# Patient Record
Sex: MALE | Race: OTHER | NOT HISPANIC OR LATINO | URBAN - METROPOLITAN AREA
[De-identification: names, ages, dates, MRNs, and addresses within clinical notes are randomized per-mention and may not be internally consistent; named-entity substitution may affect disease eponyms.]

---

## 2019-03-11 ENCOUNTER — EMERGENCY (EMERGENCY)
Facility: HOSPITAL | Age: 29
LOS: 1 days | Discharge: HOME | End: 2019-03-11
Admitting: EMERGENCY MEDICINE
Payer: COMMERCIAL

## 2019-03-11 VITALS
RESPIRATION RATE: 16 BRPM | TEMPERATURE: 98 F | HEART RATE: 90 BPM | DIASTOLIC BLOOD PRESSURE: 84 MMHG | OXYGEN SATURATION: 100 % | SYSTOLIC BLOOD PRESSURE: 128 MMHG

## 2019-03-11 PROCEDURE — 99282 EMERGENCY DEPT VISIT SF MDM: CPT

## 2019-03-11 RX ORDER — ALPRAZOLAM 0.25 MG
0.25 TABLET ORAL ONCE
Qty: 0 | Refills: 0 | Status: COMPLETED | OUTPATIENT
Start: 2019-03-11 | End: 2019-03-11

## 2019-03-11 RX ORDER — IBUPROFEN 200 MG
600 TABLET ORAL ONCE
Qty: 0 | Refills: 0 | Status: DISCONTINUED | OUTPATIENT
Start: 2019-03-11 | End: 2019-03-15
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
## 2019-03-11 NOTE — ED PROVIDER NOTE - CARE PLAN
Principal Discharge DX:	Headache  Assessment and plan of treatment:	Advance activity as tolerated.  Continue all previously prescribed medications as directed unless otherwise instructed.  Follow up with your primary care physician in 48-72 hours- bring copies of your results.  Return to the ER for worsening or persistent symptoms, and/or ANY NEW OR CONCERNING SYMPTOMS. If you have issues obtaining follow up, please call: [phone] to obtain a doctor or specialist who takes your insurance in your area.  You may call [phone] to make an appointment with the internal medicine clinic.  Secondary Diagnosis:	Stress at home

## 2019-03-11 NOTE — ED PROVIDER NOTE - NSFOLLOWUPINSTRUCTIONS_ED_ALL_ED_FT
Advance activity as tolerated.  Continue all previously prescribed medications as directed unless otherwise instructed.  Follow up with your primary care physician in 48-72 hours- bring copies of your results.  Return to the ER for worsening or persistent symptoms, and/or ANY NEW OR CONCERNING SYMPTOMS. If you have issues obtaining follow up, please call: [phone] to obtain a doctor or specialist who takes your insurance in your area.  You may call [phone] to make an appointment with the internal medicine clinic.

## 2019-03-11 NOTE — ED PROVIDER NOTE - PLAN OF CARE
Advance activity as tolerated.  Continue all previously prescribed medications as directed unless otherwise instructed.  Follow up with your primary care physician in 48-72 hours- bring copies of your results.  Return to the ER for worsening or persistent symptoms, and/or ANY NEW OR CONCERNING SYMPTOMS. If you have issues obtaining follow up, please call: [phone] to obtain a doctor or specialist who takes your insurance in your area.  You may call [phone] to make an appointment with the internal medicine clinic.

## 2019-03-11 NOTE — ED PROVIDER NOTE - CLINICAL SUMMARY MEDICAL DECISION MAKING FREE TEXT BOX
A:  -HA, stress at home  P:  -Motrin 600mgs PO, Xanax .25mgs given in ED, pt will be taking Uber home, states he feels safe to return home. Given instructions to follow up with Crisis [facility]. Pt stated he will call tomorrow to obtain appt. Pt stable to be discharged from the ED.

## 2019-03-26 ENCOUNTER — EMERGENCY (EMERGENCY)
Facility: HOSPITAL | Age: 29
LOS: 1 days | Discharge: HOME | End: 2019-03-26
Admitting: EMERGENCY MEDICINE
Payer: SELF-PAY

## 2019-03-26 VITALS
RESPIRATION RATE: 16 BRPM | DIASTOLIC BLOOD PRESSURE: 77 MMHG | SYSTOLIC BLOOD PRESSURE: 119 MMHG | OXYGEN SATURATION: 100 % | HEART RATE: 73 BPM | TEMPERATURE: 99 F

## 2019-03-26 PROCEDURE — 99283 EMERGENCY DEPT VISIT LOW MDM: CPT

## 2019-03-26 RX ORDER — SODIUM CHLORIDE 9 MG/ML
1000 INJECTION INTRAMUSCULAR; INTRAVENOUS; SUBCUTANEOUS ONCE
Qty: 0 | Refills: 0 | Status: DISCONTINUED | OUTPATIENT
Start: 2019-03-26 | End: 2019-03-26

## 2019-03-26 RX ORDER — METOCLOPRAMIDE HCL 10 MG
10 TABLET ORAL ONCE
Qty: 0 | Refills: 0 | Status: DISCONTINUED | OUTPATIENT
Start: 2019-03-26 | End: 2019-03-26

## 2019-03-26 RX ORDER — IBUPROFEN 200 MG
600 TABLET ORAL ONCE
Qty: 0 | Refills: 0 | Status: COMPLETED | OUTPATIENT
Start: 2019-03-26 | End: 2019-03-26

## 2019-03-26 RX ORDER — ACETAMINOPHEN 500 MG
650 TABLET ORAL ONCE
Qty: 0 | Refills: 0 | Status: COMPLETED | OUTPATIENT
Start: 2019-03-26 | End: 2019-03-26
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
## 2019-03-26 NOTE — ED PROVIDER NOTE - CONSTITUTIONAL, MLM
normal... Well appearing, well nourished, awake, alert, oriented to person, place, time/situation and in no apparent distress. +Pt mildly anxious appearing

## 2019-03-26 NOTE — ED PROVIDER NOTE - PROGRESS NOTE DETAILS
Pt does not want IV medications, just wants to trial tylenol and motrin. Pt seen by SW, pt states he will get a divorce. Will give behavioral health followup.

## 2019-03-26 NOTE — ED PROVIDER NOTE - CLINICAL SUMMARY MEDICAL DECISION MAKING FREE TEXT BOX
29 y/o male here with c/o gradually tension HA in the setting of stress. Plan for pain control and   to discuss counseling outside of the hospital.

## 2019-03-26 NOTE — ED ADULT TRIAGE NOTE - CHIEF COMPLAINT QUOTE
Pt comes in for c/o HA that has been presently all day. Pt denies blurred vision or n/v, appears in no acute distress and vs as noted. and pt reports taking advil at 6pm with no relief.
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
## 2019-03-26 NOTE — ED PROVIDER NOTE - CHPI ED SYMPTOMS NEG
no fever/No vison changes, blurry vision, weakness, numbness, tingling, CP, SOB, N/V/D, f/c, abd pain, auditory hallucination, SI, HI.

## 2019-03-27 VITALS
RESPIRATION RATE: 16 BRPM | HEART RATE: 80 BPM | SYSTOLIC BLOOD PRESSURE: 128 MMHG | OXYGEN SATURATION: 100 % | DIASTOLIC BLOOD PRESSURE: 82 MMHG

## 2019-03-27 RX ADMIN — Medication 650 MILLIGRAM(S): at 00:45

## 2019-03-27 RX ADMIN — Medication 600 MILLIGRAM(S): at 00:46

## 2019-09-02 ENCOUNTER — EMERGENCY (EMERGENCY)
Facility: HOSPITAL | Age: 29
LOS: 1 days | Discharge: HOME | End: 2019-09-02
Attending: EMERGENCY MEDICINE | Admitting: EMERGENCY MEDICINE
Payer: MEDICAID

## 2019-09-02 VITALS
TEMPERATURE: 99 F | DIASTOLIC BLOOD PRESSURE: 103 MMHG | SYSTOLIC BLOOD PRESSURE: 142 MMHG | RESPIRATION RATE: 16 BRPM | HEART RATE: 58 BPM | OXYGEN SATURATION: 100 %

## 2019-09-02 PROCEDURE — 93010 ELECTROCARDIOGRAM REPORT: CPT

## 2019-09-02 PROCEDURE — 99284 EMERGENCY DEPT VISIT MOD MDM: CPT | Mod: 25

## 2019-09-02 NOTE — ED ADULT TRIAGE NOTE - CHIEF COMPLAINT QUOTE
C/o chest pain, abd pain and B/L leg pain x 10 days. Endorses nausea, denies vomiting. Pt was seen at OSH yesterday where CT abd and chest x-ray were unremarkable. Denies PMH.

## 2019-09-03 PROCEDURE — 93970 EXTREMITY STUDY: CPT | Mod: 26

## 2019-09-03 RX ORDER — KETOROLAC TROMETHAMINE 30 MG/ML
30 SYRINGE (ML) INJECTION ONCE
Refills: 0 | Status: DISCONTINUED | OUTPATIENT
Start: 2019-09-03 | End: 2019-09-03

## 2019-09-03 RX ORDER — ACETAMINOPHEN 500 MG
975 TABLET ORAL ONCE
Refills: 0 | Status: COMPLETED | OUTPATIENT
Start: 2019-09-03 | End: 2019-09-03

## 2019-09-03 RX ADMIN — Medication 30 MILLIGRAM(S): at 02:02

## 2019-09-03 RX ADMIN — Medication 975 MILLIGRAM(S): at 03:53

## 2019-09-03 NOTE — ED PROVIDER NOTE - PATIENT PORTAL LINK FT
You can access the FollowMyHealth Patient Portal offered by [facility] by registering at the following website: [email or web address]. By joining [email or web address]’s [email or web address] portal, you will also be able to view your health information using other applications (apps) compatible with our system.

## 2019-09-03 NOTE — ED PROVIDER NOTE - ATTENDING CONTRIBUTION TO CARE
pt presenting with chest pain, abd pain and no SOB. Has been having symptoms for a few days now.  Went to OSH where he had labs and a CT scan and found to have some constipation.  Was dx with Miralax.  Took one dose and still had symptoms.  Also with BLE pain throughout leg but also in calf.  Works as a     Gen: Well appearing in NAD  Head: NC/AT  Neck: trachea midline  Resp:  No distress CTAB  CV: RRR  Ext: no deformities  Neuro:  A&O appears non focal  Skin:  Warm and dry as visualized  Psych:  Normal affect and mood     Pt with abd, chest and leg pain.  Already had a CT scan that was negative.  PERC negative but as  and sitting for long periods will get duplex to ensure leg pain no DVT.  Had negative cardiac labs and CK at OSH.  EKG here without signs of pericarditis.  No indication to scan again.  Will treat symptomatically.

## 2019-09-03 NOTE — ED PROVIDER NOTE - CLINICAL SUMMARY MEDICAL DECISION MAKING FREE TEXT BOX
27 yo M, w/ no known PMH, presenting w/ BL leg pain x 10 days, as well as chest pain, abdominal pain x 2 days. Had extensive workup yesterday at [facility], and entire workup negative. CP non-exertional, no shortness of breath, pain worse with palpation. Abdomen non-tender, patient eating/drinking normally, has constipation. Will obtain EKG and discharge patient home w/ PMD followup.

## 2019-09-03 NOTE — ED PROVIDER NOTE - NSFOLLOWUPINSTRUCTIONS_ED_ALL_ED_FT
You may take 1000mg of Tylenol every 6 hours for baseline pain control with respect to the warnings on the label. You may take 600mg of ibuprofen (example: motrin or advil) every 6 hours for baseline pain control as indicated with respect to the warnings on the label. This is an over the counter medication.     Please follow up with your primary care provider as soon as possible. If you do not have a primary care doctor, you can make an appointment with one at the following location.   [facility] [place]  Phone: [phone]  Fax: [phone]  Address: [address] [place] [address], [facility] [address], [place] [address]    Additionally, you may use the following web address to find a [facility] physician close to you: [email or web address]     Please return to the emergency department if you experience worsening symptoms, or if you develop headache, neck stiffness, fever/chills, changes in vision, chest pain, shortness of breath, difficulty breathing, palpitations, lightheadedness, weakness, dizziness, numbness, tingling, abdominal pain, nausea, vomiting, diarrhea, changes in your urine, blood in the urine, painful urination, syncope (passing out), or for any other concerns.

## 2019-09-03 NOTE — ED PROVIDER NOTE - CARE PLAN
Principal Discharge DX:	Leg pain, bilateral  Secondary Diagnosis:	Chest pain  Secondary Diagnosis:	Abdominal pain

## 2019-09-03 NOTE — ED PROVIDER NOTE - OBJECTIVE STATEMENT
27 yo M, accompanied by friends, w/ no known PMH and on no daily medications, presenting from home d/t BL LE pain x 10 days, as well as mild chest pain and mild diffuse abdominal pain x 2 days. Patient went to [facility] yesterday for these symptoms. Had extensive workup performed in ED. Patient was diagnosed with bloating and discharged home on Miralax. As part of workup, had normal CBC, CMP, CKMB, troponin, cxr, and CT abdomen/pelvis. CT did incidentally show L5 spondylosis and L5-S1 spondylolisthesis. Liver, gallbladder, pancreas, spleen, kidneys, adrenals, appendix, vasculature, lymph nodes, bladder, prostate, and soft tissues were all unremarkable. Stomach and bowel showed moderate stool in ascending colon without inflammation or obstruction. Patient endorses chronic constipation - last BM was yesterday and described as hard. Patient denies abnormal urine, dysuria, hematuria, nausea, vomiting. Denies exertional chest pain or shortness of breath. States that pushing on chest makes the pain worse. Denies any other complaints. Patient has been eating/drinking normally and doing all of his normal activities.     PMD: Dr. Simental  Pharm: [facility], [address] [place], [place]

## 2019-09-03 NOTE — ED ADULT NURSE REASSESSMENT NOTE - NS ED NURSE REASSESS COMMENT FT1
Pt resting on stretcher, states to feel much better, denies chest pain, sob. Respirations even and unlabored, NAD noted. will continue to monitor

## 2019-09-03 NOTE — ED ADULT NURSE NOTE - NSIMPLEMENTINTERV_GEN_ALL_ED
Implemented All Universal Safety Interventions:  [place] to call system. Call bell, personal items and telephone within reach. Instruct patient to call for assistance. Room bathroom lighting operational. Non-slip footwear when patient is off stretcher. Physically safe environment: no spills, clutter or unnecessary equipment. Stretcher in lowest position, wheels locked, appropriate side rails in place.

## 2020-03-08 ENCOUNTER — EMERGENCY (EMERGENCY)
Facility: HOSPITAL | Age: 30
LOS: 1 days | Discharge: HOME | End: 2020-03-08
Attending: EMERGENCY MEDICINE | Admitting: EMERGENCY MEDICINE
Payer: MEDICAID

## 2020-03-08 VITALS
DIASTOLIC BLOOD PRESSURE: 51 MMHG | SYSTOLIC BLOOD PRESSURE: 109 MMHG | TEMPERATURE: 100 F | OXYGEN SATURATION: 99 % | RESPIRATION RATE: 18 BRPM | HEART RATE: 84 BPM

## 2020-03-08 VITALS
SYSTOLIC BLOOD PRESSURE: 123 MMHG | DIASTOLIC BLOOD PRESSURE: 78 MMHG | RESPIRATION RATE: 16 BRPM | HEART RATE: 94 BPM | TEMPERATURE: 101 F | OXYGEN SATURATION: 97 %

## 2020-03-08 LAB
ALBUMIN SERPL ELPH-MCNC: 4.3 G/DL — SIGNIFICANT CHANGE UP (ref 3.3–5)
ALP SERPL-CCNC: 103 U/L — SIGNIFICANT CHANGE UP (ref 40–120)
ALT FLD-CCNC: 34 U/L — SIGNIFICANT CHANGE UP (ref 4–41)
ANION GAP SERPL CALC-SCNC: 15 MMO/L — HIGH (ref 7–14)
APPEARANCE UR: CLEAR — SIGNIFICANT CHANGE UP
APTT BLD: 35.7 SEC — SIGNIFICANT CHANGE UP (ref 27.5–36.3)
AST SERPL-CCNC: 31 U/L — SIGNIFICANT CHANGE UP (ref 4–40)
B PERT DNA SPEC QL NAA+PROBE: NOT DETECTED — SIGNIFICANT CHANGE UP
BACTERIA # UR AUTO: NEGATIVE — SIGNIFICANT CHANGE UP
BASE EXCESS BLDV CALC-SCNC: 2.5 MMOL/L — SIGNIFICANT CHANGE UP
BASOPHILS # BLD AUTO: 0 K/UL — SIGNIFICANT CHANGE UP (ref 0–0.2)
BASOPHILS NFR BLD AUTO: 0 % — SIGNIFICANT CHANGE UP (ref 0–2)
BASOPHILS NFR SPEC: 0 % — SIGNIFICANT CHANGE UP (ref 0–2)
BILIRUB SERPL-MCNC: 0.4 MG/DL — SIGNIFICANT CHANGE UP (ref 0.2–1.2)
BILIRUB UR-MCNC: NEGATIVE — SIGNIFICANT CHANGE UP
BLASTS # FLD: 0 % — SIGNIFICANT CHANGE UP (ref 0–0)
BLOOD GAS VENOUS - CREATININE: SIGNIFICANT CHANGE UP MG/DL (ref 0.5–1.3)
BLOOD UR QL VISUAL: NEGATIVE — SIGNIFICANT CHANGE UP
BUN SERPL-MCNC: 11 MG/DL — SIGNIFICANT CHANGE UP (ref 7–23)
C PNEUM DNA SPEC QL NAA+PROBE: NOT DETECTED — SIGNIFICANT CHANGE UP
CALCIUM SERPL-MCNC: 9 MG/DL — SIGNIFICANT CHANGE UP (ref 8.4–10.5)
CHLORIDE BLDV-SCNC: 102 MMOL/L — SIGNIFICANT CHANGE UP (ref 96–108)
CHLORIDE SERPL-SCNC: 102 MMOL/L — SIGNIFICANT CHANGE UP (ref 98–107)
CO2 SERPL-SCNC: 24 MMOL/L — SIGNIFICANT CHANGE UP (ref 22–31)
COLOR SPEC: YELLOW — SIGNIFICANT CHANGE UP
CREAT SERPL-MCNC: 1.11 MG/DL — SIGNIFICANT CHANGE UP (ref 0.5–1.3)
EOSINOPHIL # BLD AUTO: 0 K/UL — SIGNIFICANT CHANGE UP (ref 0–0.5)
EOSINOPHIL NFR BLD AUTO: 0 % — SIGNIFICANT CHANGE UP (ref 0–6)
EOSINOPHIL NFR FLD: 0.9 % — SIGNIFICANT CHANGE UP (ref 0–6)
FLUAV H1 2009 PAND RNA SPEC QL NAA+PROBE: NOT DETECTED — SIGNIFICANT CHANGE UP
FLUAV H1 RNA SPEC QL NAA+PROBE: NOT DETECTED — SIGNIFICANT CHANGE UP
FLUAV H3 RNA SPEC QL NAA+PROBE: NOT DETECTED — SIGNIFICANT CHANGE UP
FLUAV SUBTYP SPEC NAA+PROBE: NOT DETECTED — SIGNIFICANT CHANGE UP
FLUBV RNA SPEC QL NAA+PROBE: NOT DETECTED — SIGNIFICANT CHANGE UP
GAS PNL BLDV: 142 MMOL/L — SIGNIFICANT CHANGE UP (ref 136–146)
GLUCOSE BLDV-MCNC: 99 MG/DL — SIGNIFICANT CHANGE UP (ref 70–99)
GLUCOSE SERPL-MCNC: 96 MG/DL — SIGNIFICANT CHANGE UP (ref 70–99)
GLUCOSE UR-MCNC: NEGATIVE — SIGNIFICANT CHANGE UP
HADV DNA SPEC QL NAA+PROBE: NOT DETECTED — SIGNIFICANT CHANGE UP
HCO3 BLDV-SCNC: 25 MMOL/L — SIGNIFICANT CHANGE UP (ref 20–27)
HCOV PNL SPEC NAA+PROBE: SIGNIFICANT CHANGE UP
HCT VFR BLD CALC: 44.9 % — SIGNIFICANT CHANGE UP (ref 39–50)
HCT VFR BLDV CALC: 45.1 % — SIGNIFICANT CHANGE UP (ref 39–51)
HGB BLD-MCNC: 14.1 G/DL — SIGNIFICANT CHANGE UP (ref 13–17)
HGB BLDV-MCNC: 14.7 G/DL — SIGNIFICANT CHANGE UP (ref 13–17)
HMPV RNA SPEC QL NAA+PROBE: NOT DETECTED — SIGNIFICANT CHANGE UP
HPIV1 RNA SPEC QL NAA+PROBE: NOT DETECTED — SIGNIFICANT CHANGE UP
HPIV2 RNA SPEC QL NAA+PROBE: NOT DETECTED — SIGNIFICANT CHANGE UP
HPIV3 RNA SPEC QL NAA+PROBE: NOT DETECTED — SIGNIFICANT CHANGE UP
HPIV4 RNA SPEC QL NAA+PROBE: NOT DETECTED — SIGNIFICANT CHANGE UP
HYALINE CASTS # UR AUTO: NEGATIVE — SIGNIFICANT CHANGE UP
IMM GRANULOCYTES NFR BLD AUTO: 0.7 % — SIGNIFICANT CHANGE UP (ref 0–1.5)
INR BLD: 1.12 — SIGNIFICANT CHANGE UP (ref 0.88–1.17)
KETONES UR-MCNC: NEGATIVE — SIGNIFICANT CHANGE UP
LACTATE BLDV-MCNC: 1.1 MMOL/L — SIGNIFICANT CHANGE UP (ref 0.5–2)
LEUKOCYTE ESTERASE UR-ACNC: NEGATIVE — SIGNIFICANT CHANGE UP
LYMPHOCYTES # BLD AUTO: 0.83 K/UL — LOW (ref 1–3.3)
LYMPHOCYTES # BLD AUTO: 27.4 % — SIGNIFICANT CHANGE UP (ref 13–44)
LYMPHOCYTES NFR SPEC AUTO: 10.7 % — LOW (ref 13–44)
MCHC RBC-ENTMCNC: 28.3 PG — SIGNIFICANT CHANGE UP (ref 27–34)
MCHC RBC-ENTMCNC: 31.4 % — LOW (ref 32–36)
MCV RBC AUTO: 90.2 FL — SIGNIFICANT CHANGE UP (ref 80–100)
METAMYELOCYTES # FLD: 0 % — SIGNIFICANT CHANGE UP (ref 0–1)
MONOCYTES # BLD AUTO: 0.26 K/UL — SIGNIFICANT CHANGE UP (ref 0–0.9)
MONOCYTES NFR BLD AUTO: 8.6 % — SIGNIFICANT CHANGE UP (ref 2–14)
MONOCYTES NFR BLD: 1.8 % — LOW (ref 2–9)
MYELOCYTES NFR BLD: 0 % — SIGNIFICANT CHANGE UP (ref 0–0)
NEUTROPHIL AB SER-ACNC: 68.7 % — SIGNIFICANT CHANGE UP (ref 43–77)
NEUTROPHILS # BLD AUTO: 1.92 K/UL — SIGNIFICANT CHANGE UP (ref 1.8–7.4)
NEUTROPHILS NFR BLD AUTO: 63.3 % — SIGNIFICANT CHANGE UP (ref 43–77)
NEUTS BAND # BLD: 4.5 % — SIGNIFICANT CHANGE UP (ref 0–6)
NITRITE UR-MCNC: NEGATIVE — SIGNIFICANT CHANGE UP
NRBC # FLD: 0 K/UL — SIGNIFICANT CHANGE UP (ref 0–0)
OTHER - HEMATOLOGY %: 0 — SIGNIFICANT CHANGE UP
PCO2 BLDV: 48 MMHG — SIGNIFICANT CHANGE UP (ref 41–51)
PH BLDV: 7.37 PH — SIGNIFICANT CHANGE UP (ref 7.32–7.43)
PH UR: 6.5 — SIGNIFICANT CHANGE UP (ref 5–8)
PLATELET # BLD AUTO: 135 K/UL — LOW (ref 150–400)
PLATELET COUNT - ESTIMATE: SIGNIFICANT CHANGE UP
PMV BLD: 10.2 FL — SIGNIFICANT CHANGE UP (ref 7–13)
PO2 BLDV: < 24 MMHG — LOW (ref 35–40)
POTASSIUM BLDV-SCNC: 3.6 MMOL/L — SIGNIFICANT CHANGE UP (ref 3.4–4.5)
POTASSIUM SERPL-MCNC: 3.5 MMOL/L — SIGNIFICANT CHANGE UP (ref 3.5–5.3)
POTASSIUM SERPL-SCNC: 3.5 MMOL/L — SIGNIFICANT CHANGE UP (ref 3.5–5.3)
PROMYELOCYTES # FLD: 0 % — SIGNIFICANT CHANGE UP (ref 0–0)
PROT SERPL-MCNC: 7.8 G/DL — SIGNIFICANT CHANGE UP (ref 6–8.3)
PROT UR-MCNC: 50 — SIGNIFICANT CHANGE UP
PROTHROM AB SERPL-ACNC: 12.9 SEC — SIGNIFICANT CHANGE UP (ref 9.8–13.1)
RBC # BLD: 4.98 M/UL — SIGNIFICANT CHANGE UP (ref 4.2–5.8)
RBC # FLD: 13.2 % — SIGNIFICANT CHANGE UP (ref 10.3–14.5)
RBC CASTS # UR COMP ASSIST: SIGNIFICANT CHANGE UP (ref 0–?)
REVIEW TO FOLLOW: YES — SIGNIFICANT CHANGE UP
RSV RNA SPEC QL NAA+PROBE: NOT DETECTED — SIGNIFICANT CHANGE UP
RV+EV RNA SPEC QL NAA+PROBE: NOT DETECTED — SIGNIFICANT CHANGE UP
SAO2 % BLDV: 34.2 % — LOW (ref 60–85)
SODIUM SERPL-SCNC: 141 MMOL/L — SIGNIFICANT CHANGE UP (ref 135–145)
SP GR SPEC: 1.02 — SIGNIFICANT CHANGE UP (ref 1–1.04)
SQUAMOUS # UR AUTO: SIGNIFICANT CHANGE UP
UROBILINOGEN FLD QL: NORMAL — SIGNIFICANT CHANGE UP
VARIANT LYMPHS # BLD: 13.4 % — SIGNIFICANT CHANGE UP
WBC # BLD: 3.03 K/UL — LOW (ref 3.8–10.5)
WBC # FLD AUTO: 3.03 K/UL — LOW (ref 3.8–10.5)
WBC UR QL: SIGNIFICANT CHANGE UP (ref 0–?)

## 2020-03-08 PROCEDURE — 99284 EMERGENCY DEPT VISIT MOD MDM: CPT

## 2020-03-08 PROCEDURE — 71046 X-RAY EXAM CHEST 2 VIEWS: CPT | Mod: 26

## 2020-03-08 RX ORDER — ACETAMINOPHEN 500 MG
650 TABLET ORAL ONCE
Refills: 0 | Status: COMPLETED | OUTPATIENT
Start: 2020-03-08 | End: 2020-03-08

## 2020-03-08 RX ORDER — SODIUM CHLORIDE 9 MG/ML
1000 INJECTION INTRAMUSCULAR; INTRAVENOUS; SUBCUTANEOUS ONCE
Refills: 0 | Status: COMPLETED | OUTPATIENT
Start: 2020-03-08 | End: 2020-03-08

## 2020-03-08 RX ADMIN — SODIUM CHLORIDE 1000 MILLILITER(S): 9 INJECTION INTRAMUSCULAR; INTRAVENOUS; SUBCUTANEOUS at 12:16

## 2020-03-08 RX ADMIN — Medication 650 MILLIGRAM(S): at 12:16

## 2020-03-08 NOTE — ED PROVIDER NOTE - ATTENDING CONTRIBUTION TO CARE
DR. BARONE, ATTENDING MD-  I performed a face to face bedside interview with the patient regarding history of present illness, review of symptoms and past medical history. I completed an independent physical exam.  I have discussed the patient's plan of care with the resident.   Documentation as above in the note.    30 y/o male with fever x1 wk a/w cough.  No recent travel or known sick contacts.  Clear lungs, non toxic appearing.  PNA vs flu vs other viral syndrome.  Obtain cxr, flu swab, antic dc with pcp f/u.

## 2020-03-08 NOTE — ED PROVIDER NOTE - PROGRESS NOTE DETAILS
Joseph Frankel PGY1: Patient's labs unremarkable for source of infection. RVP negative. Patient with no sick contacts or recent travel concerning for coronavirus. Will dc with close PMD follow up. Discussed plan and return precautions with patient who understands and agrees. Joseph Frankel PGY1: Patient's labs unremarkable for source of infection. Not clinically septic. RVP negative. Patient with no sick contacts or recent travel concerning for coronavirus. Will dc with close PMD follow up. Discussed plan and return precautions with patient who understands and agrees.

## 2020-03-08 NOTE — ED PROVIDER NOTE - OBJECTIVE STATEMENT
30 yo M with no PMH with fever for one week. Was seen outpatient and has been taken Augmentin and tamaflu with no resolution of his symptoms. Patient states that his only symptoms are fever with intermittent non productive cough developing yesterday. Denies soar throat, neck pain, urinary symptoms n/v, rash. Sexually active with his wife only. No penile discharge. 30 yo M with no PMH with fever for one week. Was seen outpatient and has been taken Augmentin and tamaflu with no resolution of his symptoms. Patient states that his only symptoms are fever with intermittent non productive cough developing yesterday. Denies soar throat, neck pain, urinary symptoms n/v, rash. Denies recent travel or sick contacts. Sexually active with his wife only. No penile discharge.

## 2020-03-08 NOTE — ED PROVIDER NOTE - PATIENT PORTAL LINK FT
You can access the FollowMyHealth Patient Portal offered by [facility] by registering at the following website: [email or web address]. By joining [email or web address]’s FollowMyHealth portal, you will also be able to view your health information using other applications (apps) compatible with our system.

## 2020-03-08 NOTE — ED PROVIDER NOTE - CLINICAL SUMMARY MEDICAL DECISION MAKING FREE TEXT BOX
Joseph Frankel PGY1: 28 yo with no pmh presenting for 1 week of fever. Patient febrile and HR of 94. Otherwise VSS. Patient looks well and is non toxic appearing. PE as above. Patient meets SIRs criteria but low suspicion for sepsis and most likely viral syndrome. However, will get sepsis bundle, RVP, flu swab, cxr, and tylenol. Will reassess

## 2020-03-08 NOTE — ED PROVIDER NOTE - NSFOLLOWUPINSTRUCTIONS_ED_ALL_ED_FT
You were seen in the Emergency Department for fever. The most likely cause is a viral illness. There is no sign of bacterial infection. You should follow up with your primary care doctor tomorrow. Continue taking the medications he prescribed until speaking with your doctor.      - Continue taking tylenol for fever. Please do not exceed 3250 mg in 24 hours.      - Return to the Emergency Department for worsening or persistent symptoms, and/or ANY NEW OR CONCERNING SYMPTOMS. If you have issues obtaining follow up, please call: [phone] to obtain a doctor or specialist who takes your insurance in your area.

## 2020-03-08 NOTE — ED PROVIDER NOTE - NS ED ROS FT
Gen: + fever, denies weight loss  CV: Denies chest pain, palpitations  HEENT: Denies neck pain, sore throat, eye discharge  Skin: Denies rash, erythema, color changes  Resp: Denies SOB,+ cough  Endo: Denies sensitivity to heat, cold, increased urination  GI: Denies constipation, nausea, vomiting, abdominal pain  Msk: Denies back pain, LE swelling, extremity pain  : Denies dysuria, increased frequency  Neuro: Denies LOC, weakness, seizures  Psych: Denies hx of psych, hallucinations, SI

## 2020-03-08 NOTE — ED PROVIDER NOTE - PHYSICAL EXAMINATION
Gen: Alert and oriented. Appears flushed. Lying comfortably in bed. Answering questions appropriately  HEENT: extra occular movements intact, no nasal discharge, mucous membranes moist. Uvula midline. No peritonsillar abscess noted. No nuchal rigidity  CV: Regular rate and rhythm, +S1/S2, no murmurs/rubs/gallops,   Resp: Clear to ausculation bilaterally, no wheezes/rhonchi/rales  GI: Abdomen soft non-distended, non tender to palpation, no masses  MSK: No open wounds, no bruising, no LE edema, Homans sign negative bl  Neuro: A&Ox4, following commands, moving all four extremities spontaneously  Psych: appropriate mood

## 2020-03-08 NOTE — ED ADULT NURSE NOTE - OBJECTIVE STATEMENT
intake rm 5: 30 yo male, c/o fever (tmax 101.0) x 7 days. pt saw pmd and was started on amoxicillian 3 days ago, but fever persists. pt reports that cough began yesterday. pt denies headache, photophobia, neck stiffness, chest pain, sob, abdominal pain, n/v/d, dysuria. 18g iv insert to left ac, labs sent as ordered. EKG in progress. NS infusing well. pt medicated as ordered. pt denies smoking, recent travel, sick contacts

## 2020-03-08 NOTE — ED ADULT NURSE NOTE - CHIEF COMPLAINT QUOTE
Patient c/o having a fever x 7 days, he was started on antibiotics by his pmd and has taken them for the past 3 days but the fever persists.

## 2020-03-09 LAB
CULTURE RESULTS: NO GROWTH — SIGNIFICANT CHANGE UP
SPECIMEN SOURCE: SIGNIFICANT CHANGE UP

## 2020-03-09 NOTE — ED PROVIDER NOTE - NS ED ATTENDING NAME FT
I have personally performed a face to face diagnostic evaluation on this patient. I have reviewed the ACP note and agree with the history, exam and plan of care, except as noted.
Cho

## 2020-03-11 ENCOUNTER — INPATIENT (INPATIENT)
Facility: HOSPITAL | Age: 30
LOS: 1 days | Discharge: HOME | End: 2020-03-13
Attending: STUDENT IN AN ORGANIZED HEALTH CARE EDUCATION/TRAINING PROGRAM | Admitting: STUDENT IN AN ORGANIZED HEALTH CARE EDUCATION/TRAINING PROGRAM
Payer: COMMERCIAL

## 2020-03-11 VITALS
SYSTOLIC BLOOD PRESSURE: 122 MMHG | RESPIRATION RATE: 16 BRPM | TEMPERATURE: 101 F | HEART RATE: 102 BPM | DIASTOLIC BLOOD PRESSURE: 74 MMHG | OXYGEN SATURATION: 97 %

## 2020-03-11 DIAGNOSIS — K76.0 FATTY (CHANGE OF) LIVER, NOT ELSEWHERE CLASSIFIED: ICD-10-CM

## 2020-03-11 DIAGNOSIS — Z29.9 ENCOUNTER FOR PROPHYLACTIC MEASURES, UNSPECIFIED: ICD-10-CM

## 2020-03-11 DIAGNOSIS — J18.9 PNEUMONIA, UNSPECIFIED ORGANISM: ICD-10-CM

## 2020-03-11 DIAGNOSIS — R23.8 OTHER SKIN CHANGES: ICD-10-CM

## 2020-03-11 DIAGNOSIS — Z02.9 ENCOUNTER FOR ADMINISTRATIVE EXAMINATIONS, UNSPECIFIED: ICD-10-CM

## 2020-03-11 DIAGNOSIS — N17.9 ACUTE KIDNEY FAILURE, UNSPECIFIED: ICD-10-CM

## 2020-03-11 LAB
ALBUMIN SERPL ELPH-MCNC: 4.2 G/DL — SIGNIFICANT CHANGE UP (ref 3.3–5)
ALP SERPL-CCNC: 86 U/L — SIGNIFICANT CHANGE UP (ref 40–120)
ALT FLD-CCNC: 24 U/L — SIGNIFICANT CHANGE UP (ref 4–41)
ANION GAP SERPL CALC-SCNC: 15 MMO/L — HIGH (ref 7–14)
AST SERPL-CCNC: 35 U/L — SIGNIFICANT CHANGE UP (ref 4–40)
B PERT DNA SPEC QL NAA+PROBE: NOT DETECTED — SIGNIFICANT CHANGE UP
BASE EXCESS BLDV CALC-SCNC: 0.1 MMOL/L — SIGNIFICANT CHANGE UP
BASOPHILS # BLD AUTO: 0 K/UL — SIGNIFICANT CHANGE UP (ref 0–0.2)
BASOPHILS NFR BLD AUTO: 0 % — SIGNIFICANT CHANGE UP (ref 0–2)
BILIRUB SERPL-MCNC: 0.4 MG/DL — SIGNIFICANT CHANGE UP (ref 0.2–1.2)
BLOOD GAS VENOUS - CREATININE: 1.26 MG/DL — SIGNIFICANT CHANGE UP (ref 0.5–1.3)
BUN SERPL-MCNC: 11 MG/DL — SIGNIFICANT CHANGE UP (ref 7–23)
C PNEUM DNA SPEC QL NAA+PROBE: NOT DETECTED — SIGNIFICANT CHANGE UP
CALCIUM SERPL-MCNC: 9.2 MG/DL — SIGNIFICANT CHANGE UP (ref 8.4–10.5)
CHLORIDE BLDV-SCNC: 106 MMOL/L — SIGNIFICANT CHANGE UP (ref 96–108)
CHLORIDE SERPL-SCNC: 103 MMOL/L — SIGNIFICANT CHANGE UP (ref 98–107)
CO2 SERPL-SCNC: 23 MMOL/L — SIGNIFICANT CHANGE UP (ref 22–31)
CREAT SERPL-MCNC: 1.11 MG/DL — SIGNIFICANT CHANGE UP (ref 0.5–1.3)
EOSINOPHIL # BLD AUTO: 0 K/UL — SIGNIFICANT CHANGE UP (ref 0–0.5)
EOSINOPHIL NFR BLD AUTO: 0 % — SIGNIFICANT CHANGE UP (ref 0–6)
FLUAV H1 2009 PAND RNA SPEC QL NAA+PROBE: NOT DETECTED — SIGNIFICANT CHANGE UP
FLUAV H1 RNA SPEC QL NAA+PROBE: NOT DETECTED — SIGNIFICANT CHANGE UP
FLUAV H3 RNA SPEC QL NAA+PROBE: NOT DETECTED — SIGNIFICANT CHANGE UP
FLUAV SUBTYP SPEC NAA+PROBE: NOT DETECTED — SIGNIFICANT CHANGE UP
FLUBV RNA SPEC QL NAA+PROBE: NOT DETECTED — SIGNIFICANT CHANGE UP
GAS PNL BLDV: 141 MMOL/L — SIGNIFICANT CHANGE UP (ref 136–146)
GLUCOSE BLDV-MCNC: 94 MG/DL — SIGNIFICANT CHANGE UP (ref 70–99)
GLUCOSE SERPL-MCNC: 97 MG/DL — SIGNIFICANT CHANGE UP (ref 70–99)
HADV DNA SPEC QL NAA+PROBE: NOT DETECTED — SIGNIFICANT CHANGE UP
HCO3 BLDV-SCNC: 23 MMOL/L — SIGNIFICANT CHANGE UP (ref 20–27)
HCOV PNL SPEC NAA+PROBE: SIGNIFICANT CHANGE UP
HCT VFR BLD CALC: 43.1 % — SIGNIFICANT CHANGE UP (ref 39–50)
HCT VFR BLDV CALC: 41.4 % — SIGNIFICANT CHANGE UP (ref 39–51)
HGB BLD-MCNC: 14 G/DL — SIGNIFICANT CHANGE UP (ref 13–17)
HGB BLDV-MCNC: 13.5 G/DL — SIGNIFICANT CHANGE UP (ref 13–17)
HMPV RNA SPEC QL NAA+PROBE: NOT DETECTED — SIGNIFICANT CHANGE UP
HPIV1 RNA SPEC QL NAA+PROBE: NOT DETECTED — SIGNIFICANT CHANGE UP
HPIV2 RNA SPEC QL NAA+PROBE: NOT DETECTED — SIGNIFICANT CHANGE UP
HPIV3 RNA SPEC QL NAA+PROBE: NOT DETECTED — SIGNIFICANT CHANGE UP
HPIV4 RNA SPEC QL NAA+PROBE: NOT DETECTED — SIGNIFICANT CHANGE UP
IMM GRANULOCYTES NFR BLD AUTO: 0.2 % — SIGNIFICANT CHANGE UP (ref 0–1.5)
LACTATE BLDV-MCNC: 0.8 MMOL/L — SIGNIFICANT CHANGE UP (ref 0.5–2)
LYMPHOCYTES # BLD AUTO: 1.04 K/UL — SIGNIFICANT CHANGE UP (ref 1–3.3)
LYMPHOCYTES # BLD AUTO: 24.6 % — SIGNIFICANT CHANGE UP (ref 13–44)
MCHC RBC-ENTMCNC: 28.7 PG — SIGNIFICANT CHANGE UP (ref 27–34)
MCHC RBC-ENTMCNC: 32.5 % — SIGNIFICANT CHANGE UP (ref 32–36)
MCV RBC AUTO: 88.3 FL — SIGNIFICANT CHANGE UP (ref 80–100)
MONOCYTES # BLD AUTO: 0.32 K/UL — SIGNIFICANT CHANGE UP (ref 0–0.9)
MONOCYTES NFR BLD AUTO: 7.6 % — SIGNIFICANT CHANGE UP (ref 2–14)
NEUTROPHILS # BLD AUTO: 2.86 K/UL — SIGNIFICANT CHANGE UP (ref 1.8–7.4)
NEUTROPHILS NFR BLD AUTO: 67.6 % — SIGNIFICANT CHANGE UP (ref 43–77)
NRBC # FLD: 0 K/UL — SIGNIFICANT CHANGE UP (ref 0–0)
PCO2 BLDV: 44 MMHG — SIGNIFICANT CHANGE UP (ref 41–51)
PH BLDV: 7.37 PH — SIGNIFICANT CHANGE UP (ref 7.32–7.43)
PLATELET # BLD AUTO: 159 K/UL — SIGNIFICANT CHANGE UP (ref 150–400)
PMV BLD: 10 FL — SIGNIFICANT CHANGE UP (ref 7–13)
PO2 BLDV: < 24 MMHG — LOW (ref 35–40)
POTASSIUM BLDV-SCNC: 3.5 MMOL/L — SIGNIFICANT CHANGE UP (ref 3.4–4.5)
POTASSIUM SERPL-MCNC: 3.7 MMOL/L — SIGNIFICANT CHANGE UP (ref 3.5–5.3)
POTASSIUM SERPL-SCNC: 3.7 MMOL/L — SIGNIFICANT CHANGE UP (ref 3.5–5.3)
PROT SERPL-MCNC: 7.8 G/DL — SIGNIFICANT CHANGE UP (ref 6–8.3)
RBC # BLD: 4.88 M/UL — SIGNIFICANT CHANGE UP (ref 4.2–5.8)
RBC # FLD: 13.1 % — SIGNIFICANT CHANGE UP (ref 10.3–14.5)
RSV RNA SPEC QL NAA+PROBE: NOT DETECTED — SIGNIFICANT CHANGE UP
RV+EV RNA SPEC QL NAA+PROBE: NOT DETECTED — SIGNIFICANT CHANGE UP
SAO2 % BLDV: 32.7 % — LOW (ref 60–85)
SODIUM SERPL-SCNC: 141 MMOL/L — SIGNIFICANT CHANGE UP (ref 135–145)
WBC # BLD: 4.23 K/UL — SIGNIFICANT CHANGE UP (ref 3.8–10.5)
WBC # FLD AUTO: 4.23 K/UL — SIGNIFICANT CHANGE UP (ref 3.8–10.5)

## 2020-03-11 PROCEDURE — 71250 CT THORAX DX C-: CPT | Mod: 26

## 2020-03-11 PROCEDURE — 99223 1ST HOSP IP/OBS HIGH 75: CPT

## 2020-03-11 PROCEDURE — 71046 X-RAY EXAM CHEST 2 VIEWS: CPT | Mod: 26

## 2020-03-11 RX ORDER — SODIUM CHLORIDE 9 MG/ML
1000 INJECTION, SOLUTION INTRAVENOUS
Refills: 0 | Status: DISCONTINUED | OUTPATIENT
Start: 2020-03-11 | End: 2020-03-13

## 2020-03-11 RX ORDER — SODIUM CHLORIDE 9 MG/ML
1000 INJECTION INTRAMUSCULAR; INTRAVENOUS; SUBCUTANEOUS ONCE
Refills: 0 | Status: COMPLETED | OUTPATIENT
Start: 2020-03-11 | End: 2020-03-11

## 2020-03-11 RX ORDER — CEFTRIAXONE 500 MG/1
1000 INJECTION, POWDER, FOR SOLUTION INTRAMUSCULAR; INTRAVENOUS ONCE
Refills: 0 | Status: COMPLETED | OUTPATIENT
Start: 2020-03-11 | End: 2020-03-11

## 2020-03-11 RX ORDER — KETOROLAC TROMETHAMINE 30 MG/ML
10 SYRINGE (ML) INJECTION EVERY 4 HOURS
Refills: 0 | Status: DISCONTINUED | OUTPATIENT
Start: 2020-03-11 | End: 2020-03-13

## 2020-03-11 RX ORDER — AZITHROMYCIN 500 MG/1
500 TABLET, FILM COATED ORAL ONCE
Refills: 0 | Status: COMPLETED | OUTPATIENT
Start: 2020-03-11 | End: 2020-03-11

## 2020-03-11 RX ORDER — ACETAMINOPHEN 500 MG
650 TABLET ORAL ONCE
Refills: 0 | Status: COMPLETED | OUTPATIENT
Start: 2020-03-11 | End: 2020-03-11

## 2020-03-11 RX ORDER — AZITHROMYCIN 500 MG/1
500 TABLET, FILM COATED ORAL EVERY 24 HOURS
Refills: 0 | Status: DISCONTINUED | OUTPATIENT
Start: 2020-03-12 | End: 2020-03-13

## 2020-03-11 RX ORDER — KETOROLAC TROMETHAMINE 30 MG/ML
15 SYRINGE (ML) INJECTION ONCE
Refills: 0 | Status: DISCONTINUED | OUTPATIENT
Start: 2020-03-11 | End: 2020-03-11

## 2020-03-11 RX ORDER — CEFTRIAXONE 500 MG/1
1000 INJECTION, POWDER, FOR SOLUTION INTRAMUSCULAR; INTRAVENOUS EVERY 24 HOURS
Refills: 0 | Status: DISCONTINUED | OUTPATIENT
Start: 2020-03-12 | End: 2020-03-13

## 2020-03-11 RX ORDER — ACETAMINOPHEN 500 MG
650 TABLET ORAL EVERY 6 HOURS
Refills: 0 | Status: DISCONTINUED | OUTPATIENT
Start: 2020-03-11 | End: 2020-03-13

## 2020-03-11 RX ADMIN — AZITHROMYCIN 255 MILLIGRAM(S): 500 TABLET, FILM COATED ORAL at 22:26

## 2020-03-11 RX ADMIN — Medication 650 MILLIGRAM(S): at 16:13

## 2020-03-11 RX ADMIN — SODIUM CHLORIDE 1000 MILLILITER(S): 9 INJECTION INTRAMUSCULAR; INTRAVENOUS; SUBCUTANEOUS at 16:14

## 2020-03-11 RX ADMIN — SODIUM CHLORIDE 1000 MILLILITER(S): 9 INJECTION INTRAMUSCULAR; INTRAVENOUS; SUBCUTANEOUS at 20:16

## 2020-03-11 RX ADMIN — CEFTRIAXONE 100 MILLIGRAM(S): 500 INJECTION, POWDER, FOR SOLUTION INTRAMUSCULAR; INTRAVENOUS at 20:05

## 2020-03-11 RX ADMIN — Medication 15 MILLIGRAM(S): at 16:12

## 2020-03-11 NOTE — H&P ADULT - NSHPPHYSICALEXAM_GEN_ALL_CORE
PHYSICAL EXAMINATION:    APPEARANCE: Slim male, mildly anxious, sitting up in bed with intermittent dry coughing  HEENT:  Dry oral mucosa.  PERRL, EOMI	  LYMPHATIC: No lymphadenopathy  CARDIOVASCULAR: (+) S1 S2, No JVD.  No murmurs.  No edema  RESPIRATORY: Breath sounds somewhat diminished.  No wheezing, crackles, rhonchi appreciated.	  PSYCHIATRIC: A & O x 3.  Appears a bit anxious.  GASTROINTESTINAL:  Soft, Non-tender, + BS	  SKIN: A few skin colored papules on the back with surrounding erythema.  No ecchymoses, No cyanosis	  NEUROLOGICAL: Non-focal, A&Ox3, nonfocal, TEJEDA x 4 against gravity  EXTREMITIES: Normal range of motion, No clubbing, cyanosis or edema  VASCULAR: Peripheral pulses palpable 2+ bilaterally

## 2020-03-11 NOTE — H&P ADULT - ASSESSMENT
29 year old male, with no significant past history, presented to the ED secondary to fever (101F), non-productive cough, generalized fatigue, weakness, and myalgia.  Vital signs upon ED presentation as follows: BP = 122/74, HR = 102F, RR = 16, T = 38.4 F (101.1F), O2 Sat = 97% on RA.  Diagnosed with Pneumonia.  Admitted for further management of:

## 2020-03-11 NOTE — ED PROVIDER NOTE - OBJECTIVE STATEMENT
28 y/o m presents to the ed with fevers, cough, generalized fatigue, weakness, myalgias. Patient states his symptoms started 10 days ago, nonproductive cough, fever tm of 101, takes ibuprofen and tylenol which helps temporarily, was given augmentin and tamiflu by pcp with no improvement. No recent travel, no sick contacts. No vision changes, ha, neck pain, chest pain, sob, vomiting, abd pain, diarrhea, dysuria, hematuria. Was here few days ago with grossly negative workup. 30 y/o m presents to the ed with fevers, cough, generalized fatigue, weakness, myalgias. Patient states his symptoms started 10 days ago, nonproductive cough, fever tm of 101, takes ibuprofen and tylenol which helps temporarily, was given augmentin and tamiflu by pcp with no improvement. No recent travel, no sick contacts. No vision changes, ha, neck pain, chest pain, sob, vomiting, abd pain, diarrhea, dysuria, hematuria. Was here few days ago with grossly negative workup.    30yo m no pmhx pw flu like illness for 10 days. Patient reports fever, headache, myalgias, malaise, non-productive cough, sore throat, rhinorrhea. Reports worsening of symptoms since onset. Denies having received the influenza vaccine this year. Denies sick contacts. Denies smoking history. Denies chest pain, shortness of breath, abdominal pain, nausea, vomiting, diarrhea, constipation, urinary symptoms. works at [employer] but no customer contacts, no recent travel, previously on tamiflu agumentin. taking nsaids and tylenol with partial relief of sx. rvp and cxr neg 3 days ago.

## 2020-03-11 NOTE — H&P ADULT - PROBLEM SELECTOR PLAN 5
- SCDs for now; would change to pharmacological DVT prophylaxis if COVID (+)  - ambulate as tolerated

## 2020-03-11 NOTE — ED ADULT TRIAGE NOTE - CCCP TRG CHIEF CMPLNT
Blood pressure 116/68, pulse 74, temperature 97.4 °F (36.3 °C), resp. rate 18, SpO2 94 %.    He is seen to assess his response to escalation in the dose on the duetetrabenazine. Staff is happy with his response so far and he has not gained weight but the athetosis has decreased somewhat. Dose will be increased to 12 mg twice daily from 9 mg twice daily.    Plan: Reassess in 4 weeks. No further escalation is planned at this time.   flu-like symptoms

## 2020-03-11 NOTE — ED PROVIDER NOTE - CLINICAL SUMMARY MEDICAL DECISION MAKING FREE TEXT BOX
29m presents with fever, cough, myalgias, generalized weakness/fatigue, no recent travel or sick contacts, is generally well appearing and tolerating po, had recently negative w/u in ed with nl cxr and bloodwork.  unlabored breathing-will re-check labs, rvp, hydration, and symptomatic treatment-most c/w viral syndrome-repeat xr to eval for pna. 29m presents with fever, cough, myalgias, generalized weakness/fatigue, no recent travel or sick contacts, is generally well appearing and tolerating po, had recently negative w/u in ed with nl cxr and bloodwork.  unlabored breathing-will re-check labs, rvp, hydration, and symptomatic treatment-most c/w viral syndrome-repeat xr to eval for pna.    pettet- 29m pw, concern for Influenza, URI, PNA, will do flu pcr, fluids for dehydration, tylenol for fever, toradol for analgesia, cxr, r/o pna, labs, reassess. 29m presents with fever, cough, myalgias, generalized weakness/fatigue, no recent travel or sick contacts, is generally nontoxic appearing and tolerating po, had recently negative w/u in ed with nl cxr and bloodwork.  unlabored breathing-will re-check labs, rvp, hydration, and symptomatic treatment-most c/w viral syndrome-repeat xr to eval for pna.    pettet- 29m pw, concern for Influenza, URI, PNA, will do flu pcr, fluids for dehydration, tylenol for fever, toradol for analgesia, cxr, r/o pna, labs, reassess.

## 2020-03-11 NOTE — ED PROVIDER NOTE - NS ED ROS FT
ROS:  GENERAL: +fever, + chills  EYES: no change in vision  HEENT: no trouble swallowing, no trouble speaking  CARDIAC: no chest pain  PULMONARY: + cough, no shortness of breath  GI: no abdominal pain, no nausea, no vomiting, no diarrhea, no constipation  : No dysuria, no frequency, no change in appearance, or odor of urine  SKIN: no rashes  NEURO: no headache, no weakness  MSK: +myalgias

## 2020-03-11 NOTE — H&P ADULT - HISTORY OF PRESENT ILLNESS
29 year old male, with no significant past history, presented to the ED secondary to fever (101F), non-productive cough, generalized fatigue, weakness, and myalgia.  Seen and evaluated at bedside;    Vital signs upon ED presentation as follows: BP = 122/74, HR = 102F, RR = 16, T = 38.4 F (101.1F), O2 Sat = 97% on RA.  Diagnosed with Pneumonia.  Prescribed azithromycin, ceftriaxone, NaCl 2 liters, Tylenol 650 mg and ketorolac 15 mg IV in the ED. 29 year old male, with no significant past history, presented to the ED secondary to fever (101F), non-productive cough, generalized fatigue, weakness, and myalgia.  Seen and evaluated at bedside; coughing intermittently, but in NAD.  States fever to 101F max.  Has had non-productive coughing for the past ~ 3 days.  Took prescribed antimicrobials (Augmentin and Tamiflu - per reports), without improvement.  Some mild shortness of breath at rest occasionally - chiefly with deep inspiration.  Indicates no weakness, pain.  Reports of taking ibuprofen and Tylenol PRN, but with only short relief of symptoms.  Does not do vaping.  States no fatigue, weakness or myalgia.  However, noted to have received Ketorolac since admission.  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  States no sick contacts, including co-workers.  No recent travel.  Wife is well and without any similar signs/symptoms.    Vital signs upon ED presentation as follows: BP = 122/74, HR = 102F, RR = 16, T = 38.4 F (101.1F), O2 Sat = 97% on RA.  Diagnosed with Pneumonia.  Prescribed azithromycin, ceftriaxone, NaCl 2 liters, Tylenol 650 mg and ketorolac 15 mg IV in the ED.

## 2020-03-11 NOTE — H&P ADULT - NSICDXFAMHXPERTINENTNEGATIVE_GEN_A_CORE_FT
Follow up appt scheduled 4/11/2017 at 1pm. Message left for mother to return call to clinic regarding appt.   Hypertension, Diabetes mellitus

## 2020-03-11 NOTE — H&P ADULT - PROBLEM SELECTOR PLAN 2
- BUN/Cr = 11/1.11; likely acute, in the setting of current illness, insensible losses  - IVF hydration; s/p 2 liters NaCL in the ED; continuing w/ LR x 20 hours  - f/u for improvement w/ repeat lab-work - BUN/Cr = 11/1.11; likely acute, in the setting of current illness, insensible losses  - IVF hydration; s/p 2 liters NaCl in the ED; continuing w/ LR x 20 hours  - f/u for improvement w/ repeat lab-work

## 2020-03-11 NOTE — ED ADULT TRIAGE NOTE - CHIEF COMPLAINT QUOTE
Pt. c/o dry cough and fever x 10 days. Seen in ER on 3/8, RVP negative. No recent travel. Denies nausea, vomiting or diarrhea.

## 2020-03-11 NOTE — H&P ADULT - PROBLEM SELECTOR PLAN 4
- a few scattered papules on the back with surrounding erythema  - no itching, per patient  - likely associated with current illness  - evaluate for any increased distribution

## 2020-03-11 NOTE — H&P ADULT - PROBLEM SELECTOR PLAN 3
- incidental finding on CT of chest  - no history of DM, alcohol abuse, and patient is thin, so not due to obesity  - no RUQ pain reported, and no tenderness appreciated  - f/u HgbA1c, lipid panel, hepatitis panel in the AM

## 2020-03-11 NOTE — H&P ADULT - PROBLEM SELECTOR PLAN 1
- non-productive coughing, fever, shortness of breath  - likely of viral etiology, but could have superimposed bacterial penumonia  - RVP negative  - CT w/ finding of "Diffuse peripheral ground glass opacities. Findings are nonspecific but can be seen in the setting of a viral pneumonia. "  - failed outpatient Augmentin and Tamiflu, per reports  - started on ceftriaxone and azithromycin; continuing  - ensure optimal hydration  - ketorolac 10 mg Q4H PRN moderate or severe pain, Tylenol PRN fever or mild pain  - f/u cultures  - f/u pulse oximetry, HOB elevation - non-productive coughing, fever, shortness of breath  - likely of viral etiology, but could have superimposed bacterial pneumonia.  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 Findings are nonspecific but can be seen in the setting of a viral pneumonia. "  - failed outpatient Augmentin and Tamiflu, per reports  - started on ceftriaxone and azithromycin; continuing  - ensure optimal hydration  - ketorolac 10 mg Q4H PRN moderate or severe pain, Tylenol PRN fever or mild pain  - f/u cultures  - f/u pulse oximetry, HOB elevation - non-productive coughing, fever, shortness of breath  - likely of viral etiology, but could have superimposed bacterial pneumonia.  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 Findings are nonspecific but can be seen in the setting of a viral pneumonia. "  - failed outpatient Augmentin and Tamiflu, per reports  - started on ceftriaxone and azithromycin; continuing  - ensure optimal hydration  - ketorolac 10 mg Q4H PRN moderate or severe pain, Tylenol PRN fever or mild pain  - f/u cultures, including COVID-19 (in progress)  - f/u pulse oximetry, HOB elevation

## 2020-03-11 NOTE — ED ADULT NURSE NOTE - NSIMPLEMENTINTERV_GEN_ALL_ED
Implemented All Universal Safety Interventions:  [place] to call system. Call bell, personal items and telephone within reach. Instruct patient to call for assistance. Room bathroom lighting operational. Non-slip footwear when patient is off stretcher. Physically safe environment: no spills, clutter or unnecessary equipment. Stretcher in lowest position, wheels locked, appropriate side rails in place.

## 2020-03-11 NOTE — H&P ADULT - NSHPLABSRESULTS_GEN_ALL_CORE
Vital Signs Last 24 Hrs  T(C): 36.9 (11 Mar 2020 19:51), Max: 38.4 (11 Mar 2020 13:29)  T(F): 98.4 (11 Mar 2020 19:51), Max: 101.1 (11 Mar 2020 13:29)  HR: 89 (11 Mar 2020 19:51) (89 - 102)  BP: 141/70 (11 Mar 2020 19:51) (122/74 - 141/70)  BP(mean): --  RR: 18 (11 Mar 2020 19:51) (16 - 18)  SpO2: 98% (11 Mar 2020 19:51) (97% - 98%)    ===================================================                        14.0   4.23  )-----------( 159      ( 11 Mar 2020 16:20 )             43.1     11 Mar 2020 16:20    141    |  103    |  11     ----------------------------<  97     3.7     |  23     |  1.11     Ca    9.2        11 Mar 2020 16:20    TPro  7.8    /  Alb  4.2    /  TBili  0.4    /  DBili  x      /  AST  35     /  ALT  24     /  AlkPhos  86     11 Mar 2020 16:20    LIVER FUNCTIONS - ( 11 Mar 2020 16:20 )  Alb: 4.2 g/dL / Pro: 7.8 g/dL / ALK PHOS: 86 u/L / ALT: 24 u/L / AST: 35 u/L / GGT: x             CAPILLARY BLOOD GLUCOSE    ===================================================      ===================================================

## 2020-03-11 NOTE — ED PROVIDER NOTE - PROGRESS NOTE DETAILS
ct findings concerning for possible covid will give empric abx for bact pna, blood cultures, isolation, sned covid swab, admit to hospitalist smith-xray noted atelectasis to l. lung new from prior x-ray. ct performed to further eval findings -radiology called and informed of multiple ground glass opacities c/w viral pneumonia. Covid test sent and a d n informed given ct findings, given abx for cap, placed on airborne precuations, admitted for further management, unlabored breathing, stable. patient informed of findings. ct findings concerning for possible viral pna/covid will give empric abx for bact pna, blood cultures, airborne/contact isolation, send covid swab, admit to hospitalist

## 2020-03-12 DIAGNOSIS — E55.9 VITAMIN D DEFICIENCY, UNSPECIFIED: ICD-10-CM

## 2020-03-12 LAB
24R-OH-CALCIDIOL SERPL-MCNC: 18.8 NG/ML — LOW (ref 30–80)
ANION GAP SERPL CALC-SCNC: 12 MMO/L — SIGNIFICANT CHANGE UP (ref 7–14)
ANISOCYTOSIS BLD QL: SLIGHT — SIGNIFICANT CHANGE UP
BASOPHILS # BLD AUTO: 0.01 K/UL — SIGNIFICANT CHANGE UP (ref 0–0.2)
BASOPHILS NFR BLD AUTO: 0.3 % — SIGNIFICANT CHANGE UP (ref 0–2)
BASOPHILS NFR SPEC: 0.9 % — SIGNIFICANT CHANGE UP (ref 0–2)
BLASTS # FLD: 0 % — SIGNIFICANT CHANGE UP (ref 0–0)
BUN SERPL-MCNC: 8 MG/DL — SIGNIFICANT CHANGE UP (ref 7–23)
CALCIUM SERPL-MCNC: 8.5 MG/DL — SIGNIFICANT CHANGE UP (ref 8.4–10.5)
CHLORIDE SERPL-SCNC: 109 MMOL/L — HIGH (ref 98–107)
CHOLEST SERPL-MCNC: 134 MG/DL — SIGNIFICANT CHANGE UP (ref 120–199)
CO2 SERPL-SCNC: 24 MMOL/L — SIGNIFICANT CHANGE UP (ref 22–31)
CREAT SERPL-MCNC: 1 MG/DL — SIGNIFICANT CHANGE UP (ref 0.5–1.3)
EOSINOPHIL # BLD AUTO: 0.01 K/UL — SIGNIFICANT CHANGE UP (ref 0–0.5)
EOSINOPHIL NFR BLD AUTO: 0.3 % — SIGNIFICANT CHANGE UP (ref 0–6)
EOSINOPHIL NFR FLD: 0 % — SIGNIFICANT CHANGE UP (ref 0–6)
GIANT PLATELETS BLD QL SMEAR: PRESENT — SIGNIFICANT CHANGE UP
GLUCOSE SERPL-MCNC: 95 MG/DL — SIGNIFICANT CHANGE UP (ref 70–99)
HAV IGM SER-ACNC: NONREACTIVE — SIGNIFICANT CHANGE UP
HBV CORE IGM SER-ACNC: NONREACTIVE — SIGNIFICANT CHANGE UP
HBV SURFACE AG SER-ACNC: NONREACTIVE — SIGNIFICANT CHANGE UP
HCT VFR BLD CALC: 39.5 % — SIGNIFICANT CHANGE UP (ref 39–50)
HCV AB S/CO SERPL IA: 0.16 S/CO — SIGNIFICANT CHANGE UP (ref 0–0.99)
HCV AB SERPL-IMP: SIGNIFICANT CHANGE UP
HDLC SERPL-MCNC: 31 MG/DL — LOW (ref 35–55)
HGB BLD-MCNC: 13 G/DL — SIGNIFICANT CHANGE UP (ref 13–17)
IMM GRANULOCYTES NFR BLD AUTO: 0.3 % — SIGNIFICANT CHANGE UP (ref 0–1.5)
LIPID PNL WITH DIRECT LDL SERPL: 71 MG/DL — SIGNIFICANT CHANGE UP
LYMPHOCYTES # BLD AUTO: 1.97 K/UL — SIGNIFICANT CHANGE UP (ref 1–3.3)
LYMPHOCYTES # BLD AUTO: 57.3 % — HIGH (ref 13–44)
LYMPHOCYTES NFR SPEC AUTO: 39.1 % — SIGNIFICANT CHANGE UP (ref 13–44)
MAGNESIUM SERPL-MCNC: 2.4 MG/DL — SIGNIFICANT CHANGE UP (ref 1.6–2.6)
MCHC RBC-ENTMCNC: 28.7 PG — SIGNIFICANT CHANGE UP (ref 27–34)
MCHC RBC-ENTMCNC: 32.9 % — SIGNIFICANT CHANGE UP (ref 32–36)
MCV RBC AUTO: 87.2 FL — SIGNIFICANT CHANGE UP (ref 80–100)
METAMYELOCYTES # FLD: 0 % — SIGNIFICANT CHANGE UP (ref 0–1)
MICROCYTES BLD QL: SLIGHT — SIGNIFICANT CHANGE UP
MONOCYTES # BLD AUTO: 0.35 K/UL — SIGNIFICANT CHANGE UP (ref 0–0.9)
MONOCYTES NFR BLD AUTO: 10.2 % — SIGNIFICANT CHANGE UP (ref 2–14)
MONOCYTES NFR BLD: 10.9 % — HIGH (ref 2–9)
MYELOCYTES NFR BLD: 0 % — SIGNIFICANT CHANGE UP (ref 0–0)
NEUTROPHIL AB SER-ACNC: 37.3 % — LOW (ref 43–77)
NEUTROPHILS # BLD AUTO: 1.09 K/UL — LOW (ref 1.8–7.4)
NEUTROPHILS NFR BLD AUTO: 31.6 % — LOW (ref 43–77)
NEUTS BAND # BLD: 1.8 % — SIGNIFICANT CHANGE UP (ref 0–6)
NRBC # FLD: 0 K/UL — SIGNIFICANT CHANGE UP (ref 0–0)
OTHER - HEMATOLOGY %: 0 — SIGNIFICANT CHANGE UP
PHOSPHATE SERPL-MCNC: 3.2 MG/DL — SIGNIFICANT CHANGE UP (ref 2.5–4.5)
PLATELET # BLD AUTO: 159 K/UL — SIGNIFICANT CHANGE UP (ref 150–400)
PLATELET COUNT - ESTIMATE: NORMAL — SIGNIFICANT CHANGE UP
PMV BLD: 9.9 FL — SIGNIFICANT CHANGE UP (ref 7–13)
POTASSIUM SERPL-MCNC: 3.8 MMOL/L — SIGNIFICANT CHANGE UP (ref 3.5–5.3)
POTASSIUM SERPL-SCNC: 3.8 MMOL/L — SIGNIFICANT CHANGE UP (ref 3.5–5.3)
PROCALCITONIN SERPL-MCNC: 0.1 NG/ML — SIGNIFICANT CHANGE UP (ref 0.02–0.1)
PROMYELOCYTES # FLD: 0 % — SIGNIFICANT CHANGE UP (ref 0–0)
RBC # BLD: 4.53 M/UL — SIGNIFICANT CHANGE UP (ref 4.2–5.8)
RBC # FLD: 13.1 % — SIGNIFICANT CHANGE UP (ref 10.3–14.5)
REVIEW TO FOLLOW: YES — SIGNIFICANT CHANGE UP
SARS-COV-2 RNA SPEC QL NAA+PROBE: DETECTED — SIGNIFICANT CHANGE UP
SMUDGE CELLS # BLD: PRESENT — SIGNIFICANT CHANGE UP
SODIUM SERPL-SCNC: 145 MMOL/L — SIGNIFICANT CHANGE UP (ref 135–145)
TRIGL SERPL-MCNC: 186 MG/DL — HIGH (ref 10–149)
TSH SERPL-MCNC: 0.86 UIU/ML — SIGNIFICANT CHANGE UP (ref 0.27–4.2)
VARIANT LYMPHS # BLD: 9.1 % — SIGNIFICANT CHANGE UP
WBC # BLD: 3.44 K/UL — LOW (ref 3.8–10.5)
WBC # FLD AUTO: 3.44 K/UL — LOW (ref 3.8–10.5)

## 2020-03-12 PROCEDURE — 99233 SBSQ HOSP IP/OBS HIGH 50: CPT

## 2020-03-12 RX ORDER — CHOLECALCIFEROL (VITAMIN D3) 125 MCG
1000 CAPSULE ORAL DAILY
Refills: 0 | Status: DISCONTINUED | OUTPATIENT
Start: 2020-03-12 | End: 2020-03-13

## 2020-03-12 RX ORDER — INFLUENZA VIRUS VACCINE 15; 15; 15; 15 UG/.5ML; UG/.5ML; UG/.5ML; UG/.5ML
0.5 SUSPENSION INTRAMUSCULAR ONCE
Refills: 0 | Status: DISCONTINUED | OUTPATIENT
Start: 2020-03-12 | End: 2020-03-13

## 2020-03-12 RX ADMIN — AZITHROMYCIN 255 MILLIGRAM(S): 500 TABLET, FILM COATED ORAL at 21:34

## 2020-03-12 RX ADMIN — Medication 650 MILLIGRAM(S): at 02:30

## 2020-03-12 RX ADMIN — SODIUM CHLORIDE 100 MILLILITER(S): 9 INJECTION, SOLUTION INTRAVENOUS at 10:35

## 2020-03-12 RX ADMIN — Medication 650 MILLIGRAM(S): at 01:37

## 2020-03-12 RX ADMIN — CEFTRIAXONE 100 MILLIGRAM(S): 500 INJECTION, POWDER, FOR SOLUTION INTRAMUSCULAR; INTRAVENOUS at 19:59

## 2020-03-12 RX ADMIN — SODIUM CHLORIDE 100 MILLILITER(S): 9 INJECTION, SOLUTION INTRAVENOUS at 01:32

## 2020-03-12 RX ADMIN — SODIUM CHLORIDE 100 MILLILITER(S): 9 INJECTION, SOLUTION INTRAVENOUS at 00:34

## 2020-03-12 NOTE — ED ADULT NURSE REASSESSMENT NOTE - NS ED NURSE REASSESS COMMENT FT1
[facility] Pt. a&ox4, ambulatory. VSS. Pt. respirations even and unlabored. Pt. denies any complaints of chest pain, SOB, fevers, chills, nausea, or vomiting. Pt. resting in stretcher. Report given to floor to ENRIKE Ochoa. Awaiting further orders. Will continue to monitor.

## 2020-03-12 NOTE — PROGRESS NOTE ADULT - SUBJECTIVE AND OBJECTIVE BOX
Jennifer Lamar  [facility] of Hospital Medicine  Pager #[phone]    Patient is a 29y old  Male who presents with a chief complaint of Pneumonia (11 Mar 2020 20:50)      SUBJECTIVE / OVERNIGHT EVENTS: Patient seen and examined at bedside. Patient feeling much better today- no longer SOB. Afebrile today.    ADDITIONAL REVIEW OF SYSTEMS:    MEDICATIONS  (STANDING):  azithromycin  IVPB 500 milliGRAM(s) IV Intermittent every 24 hours  cefTRIAXone   IVPB 1000 milliGRAM(s) IV Intermittent every 24 hours  cholecalciferol 1000 Unit(s) Oral daily  influenza   Vaccine 0.5 milliLiter(s) IntraMuscular once  lactated ringers. 1000 milliLiter(s) (100 mL/Hr) IV Continuous <Continuous>    MEDICATIONS  (PRN):  acetaminophen   Tablet .. 650 milliGRAM(s) Oral every 6 hours PRN Temp greater or equal to 38C (100.4F), Mild Pain (1 - 3)  ketorolac 10 milliGRAM(s) Oral every 4 hours PRN Moderate Pain (4 - 6) or Severe Pain (7 - 10)      CAPILLARY BLOOD GLUCOSE        I&O's Summary      PHYSICAL EXAM:    Vital Signs Last 24 Hrs  T(C): 37 (12 Mar 2020 14:20), Max: 37 (12 Mar 2020 14:20)  T(F): 98.6 (12 Mar 2020 14:20), Max: 98.6 (12 Mar 2020 14:20)  HR: 75 (12 Mar 2020 14:20) (62 - 89)  BP: 124/76 (12 Mar 2020 14:20) (119/70 - 141/70)  BP(mean): --  RR: 18 (12 Mar 2020 14:20) (18 - 18)  SpO2: 99% (12 Mar 2020 14:20) (98% - 100%)    CONSTITUTIONAL: NAD, well-developed, well-groomed  EYES: conjunctiva and sclera clear  ENMT: Moist oral mucosa, no pharyngeal injection or exudates  RESPIRATORY: Normal respiratory effort; decreased breath sounds b/l  CARDIOVASCULAR: Regular rate and rhythm, normal S1 and S2, no murmur/rub/gallop; No lower extremity edema; Peripheral pulses are 2+ bilaterally  ABDOMEN: Nontender to palpation, normoactive bowel sounds, no rebound/guarding  MUSCULOSKELETAL: no clubbing or cyanosis of digits; no joint swelling or tenderness to palpation  PSYCH: A+O to person, place, and time; affect appropriate  NEUROLOGY: CN 2-12 are intact and symmetric; no gross sensory deficits   SKIN: No rashes; no palpable lesions    LABS:                        13.0   3.44  )-----------( 159      ( 12 Mar 2020 06:45 )             39.5     03-12    145  |  109<H>  |  8   ----------------------------<  95  3.8   |  24  |  1.00    Ca    8.5      12 Mar 2020 06:45  Phos  3.2     03-12  Mg     2.4     03-12    TPro  7.8  /  Alb  4.2  /  TBili  0.4  /  DBili  x   /  AST  35  /  ALT  24  /  AlkPhos  86  03-11      RADIOLOGY & ADDITIONAL TESTS: No new imaging  Results Reviewed: Yes  Imaging Personally Reviewed:  Electrocardiogram Personally Reviewed:    COORDINATION OF CARE:  Care Discussed with Consultants/Other Providers [Y/N]:  Prior or Outpatient Records Reviewed [Y/N]:

## 2020-03-12 NOTE — CHART NOTE - NSCHARTNOTEFT_GEN_A_CORE
Called by RN - pt + covid19.    Notified infection control  Notified pt's PCP Dr. Gaitan   NP to inform pt.

## 2020-03-12 NOTE — PROGRESS NOTE ADULT - PROBLEM SELECTOR PLAN 2
- incidental finding on CT of chest  - no history of DM, alcohol abuse, and patient is thin, so not due to obesity  - no RUQ pain reported, and no tenderness appreciated  - f/u HgbA1c  - hepatitis panel negative

## 2020-03-12 NOTE — PROGRESS NOTE ADULT - ASSESSMENT
29 year old male, with no significant past history, presented to the ED secondary to fever (101F), non-productive cough, generalized fatigue, weakness, and myalgia found to have GGO on CT chest, r/o COVID-19 infection.

## 2020-03-12 NOTE — PROGRESS NOTE ADULT - PROBLEM SELECTOR PLAN 6
1.  PCP Name: No PCP  2.  PCP Contacted at Admission =  [  ] Y       [X] N          [  ] N/A  3.  PCP Contacted at Discharge  =  [  ] Y       [  ] N          [  ] N/A  4.  Post-Discharge Appointment Date and Location =   5.  Summary of Handoff Given to PCP                    =

## 2020-03-13 VITALS
OXYGEN SATURATION: 99 % | RESPIRATION RATE: 18 BRPM | HEART RATE: 78 BPM | DIASTOLIC BLOOD PRESSURE: 76 MMHG | TEMPERATURE: 98 F | SYSTOLIC BLOOD PRESSURE: 129 MMHG

## 2020-03-13 DIAGNOSIS — B34.2 CORONAVIRUS INFECTION, UNSPECIFIED: ICD-10-CM

## 2020-03-13 LAB
ANION GAP SERPL CALC-SCNC: 13 MMO/L — SIGNIFICANT CHANGE UP (ref 7–14)
BASOPHILS # BLD AUTO: 0.01 K/UL — SIGNIFICANT CHANGE UP (ref 0–0.2)
BASOPHILS NFR BLD AUTO: 0.2 % — SIGNIFICANT CHANGE UP (ref 0–2)
BUN SERPL-MCNC: 9 MG/DL — SIGNIFICANT CHANGE UP (ref 7–23)
CALCIUM SERPL-MCNC: 9.2 MG/DL — SIGNIFICANT CHANGE UP (ref 8.4–10.5)
CHLORIDE SERPL-SCNC: 102 MMOL/L — SIGNIFICANT CHANGE UP (ref 98–107)
CO2 SERPL-SCNC: 26 MMOL/L — SIGNIFICANT CHANGE UP (ref 22–31)
CREAT SERPL-MCNC: 0.87 MG/DL — SIGNIFICANT CHANGE UP (ref 0.5–1.3)
CULTURE RESULTS: SIGNIFICANT CHANGE UP
CULTURE RESULTS: SIGNIFICANT CHANGE UP
EOSINOPHIL # BLD AUTO: 0.01 K/UL — SIGNIFICANT CHANGE UP (ref 0–0.5)
EOSINOPHIL NFR BLD AUTO: 0.2 % — SIGNIFICANT CHANGE UP (ref 0–6)
GLUCOSE SERPL-MCNC: 111 MG/DL — HIGH (ref 70–99)
HBA1C BLD-MCNC: 5.7 % — HIGH (ref 4–5.6)
HCT VFR BLD CALC: 41.8 % — SIGNIFICANT CHANGE UP (ref 39–50)
HGB BLD-MCNC: 13.9 G/DL — SIGNIFICANT CHANGE UP (ref 13–17)
IMM GRANULOCYTES NFR BLD AUTO: 0.5 % — SIGNIFICANT CHANGE UP (ref 0–1.5)
IRON SATN MFR SERPL: 281 UG/DL — SIGNIFICANT CHANGE UP (ref 155–535)
IRON SATN MFR SERPL: 67 UG/DL — SIGNIFICANT CHANGE UP (ref 45–165)
LDH SERPL L TO P-CCNC: 246 U/L — HIGH (ref 135–225)
LYMPHOCYTES # BLD AUTO: 1.39 K/UL — SIGNIFICANT CHANGE UP (ref 1–3.3)
LYMPHOCYTES # BLD AUTO: 33.3 % — SIGNIFICANT CHANGE UP (ref 13–44)
MAGNESIUM SERPL-MCNC: 2.4 MG/DL — SIGNIFICANT CHANGE UP (ref 1.6–2.6)
MCHC RBC-ENTMCNC: 28.4 PG — SIGNIFICANT CHANGE UP (ref 27–34)
MCHC RBC-ENTMCNC: 33.3 % — SIGNIFICANT CHANGE UP (ref 32–36)
MCV RBC AUTO: 85.5 FL — SIGNIFICANT CHANGE UP (ref 80–100)
MONOCYTES # BLD AUTO: 0.34 K/UL — SIGNIFICANT CHANGE UP (ref 0–0.9)
MONOCYTES NFR BLD AUTO: 8.1 % — SIGNIFICANT CHANGE UP (ref 2–14)
NEUTROPHILS # BLD AUTO: 2.41 K/UL — SIGNIFICANT CHANGE UP (ref 1.8–7.4)
NEUTROPHILS NFR BLD AUTO: 57.7 % — SIGNIFICANT CHANGE UP (ref 43–77)
NRBC # FLD: 0 K/UL — SIGNIFICANT CHANGE UP (ref 0–0)
PHOSPHATE SERPL-MCNC: 2.7 MG/DL — SIGNIFICANT CHANGE UP (ref 2.5–4.5)
PLATELET # BLD AUTO: 188 K/UL — SIGNIFICANT CHANGE UP (ref 150–400)
PMV BLD: 9.7 FL — SIGNIFICANT CHANGE UP (ref 7–13)
POTASSIUM SERPL-MCNC: 3.4 MMOL/L — LOW (ref 3.5–5.3)
POTASSIUM SERPL-SCNC: 3.4 MMOL/L — LOW (ref 3.5–5.3)
RBC # BLD: 4.89 M/UL — SIGNIFICANT CHANGE UP (ref 4.2–5.8)
RBC # FLD: 12.8 % — SIGNIFICANT CHANGE UP (ref 10.3–14.5)
SODIUM SERPL-SCNC: 141 MMOL/L — SIGNIFICANT CHANGE UP (ref 135–145)
SPECIMEN SOURCE: SIGNIFICANT CHANGE UP
SPECIMEN SOURCE: SIGNIFICANT CHANGE UP
UIBC SERPL-MCNC: 213.8 UG/DL — SIGNIFICANT CHANGE UP (ref 110–370)
WBC # BLD: 4.18 K/UL — SIGNIFICANT CHANGE UP (ref 3.8–10.5)
WBC # FLD AUTO: 4.18 K/UL — SIGNIFICANT CHANGE UP (ref 3.8–10.5)

## 2020-03-13 PROCEDURE — 99239 HOSP IP/OBS DSCHRG MGMT >30: CPT

## 2020-03-13 RX ORDER — AZITHROMYCIN 500 MG/1
500 TABLET, FILM COATED ORAL ONCE
Refills: 0 | Status: COMPLETED | OUTPATIENT
Start: 2020-03-13 | End: 2020-03-13

## 2020-03-13 RX ORDER — POTASSIUM CHLORIDE 20 MEQ
40 PACKET (EA) ORAL ONCE
Refills: 0 | Status: COMPLETED | OUTPATIENT
Start: 2020-03-13 | End: 2020-03-13

## 2020-03-13 RX ORDER — ACETAMINOPHEN 500 MG
2 TABLET ORAL
Qty: 0 | Refills: 0 | DISCHARGE
Start: 2020-03-13

## 2020-03-13 RX ORDER — CHOLECALCIFEROL (VITAMIN D3) 125 MCG
1000 CAPSULE ORAL
Qty: 0 | Refills: 0 | DISCHARGE
Start: 2020-03-13

## 2020-03-13 RX ADMIN — Medication 40 MILLIEQUIVALENT(S): at 12:37

## 2020-03-13 RX ADMIN — Medication 1000 UNIT(S): at 12:37

## 2020-03-13 RX ADMIN — AZITHROMYCIN 255 MILLIGRAM(S): 500 TABLET, FILM COATED ORAL at 12:36

## 2020-03-13 NOTE — DISCHARGE NOTE PROVIDER - NSDCCPCAREPLAN_GEN_ALL_CORE_FT
PRINCIPAL DISCHARGE DIAGNOSIS  Diagnosis: Coronavirus infection  Assessment and Plan of Treatment: You were hospitalized for coronavirus (COVID-19) infection. You were monitored in the hospital for symptoms and your fever resolved. Your breathing improved and you did not require supplemental oxygen. It is important that you self-quarantine yourself at home for the next 14 days in order to minimize spreading this infection to other members of the public, as well as your family. Wear a mask at all times and sanitize countertops and other areas frequented in your house often. Take Tylenol or ibuprofen as needed for fever. Return to the ER if you develop difficulty breathing with fever.

## 2020-03-13 NOTE — PROGRESS NOTE ADULT - PROBLEM SELECTOR PLAN 2
COVID-19 PCR positive on 3/12  - Remains afebrile for >48h  - No longer having URI symptoms  - Supportive care  - Will call patient's PCP

## 2020-03-13 NOTE — DISCHARGE NOTE PROVIDER - CARE PROVIDER_API CALL
Michi Gaitan  [address] [facility] [place] [address]  Phone: [phone]  Fax: (   )    -  Follow Up Time:

## 2020-03-13 NOTE — PROGRESS NOTE ADULT - PROBLEM SELECTOR PLAN 3
- incidental finding on CT of chest  - no history of DM, alcohol abuse, and patient is thin, so not due to obesity  - HgbA1c 5.7%  - hepatitis panel negative

## 2020-03-13 NOTE — DISCHARGE NOTE PROVIDER - NSDCMRMEDTOKEN_GEN_ALL_CORE_FT
acetaminophen 325 mg oral tablet: 2 tab(s) orally every 6 hours, As needed, Temp greater or equal to 38C (100.4F), Mild Pain (1 - 3)  cholecalciferol oral tablet: 1000 unit(s) orally once a day

## 2020-03-13 NOTE — PROGRESS NOTE ADULT - PROBLEM SELECTOR PLAN 7
1.  PCP Name: Dr. Michi Gaitan   2.  PCP Contacted at Admission =  [  ] Y       [X] N          [  ] N/A  3.  PCP Contacted at Discharge  =  [  ] Y       [  ] N          [  ] N/A  4.  Post-Discharge Appointment Date and Location =   5.  Summary of Handoff Given to PCP                    = 1.  PCP Name: Dr. Michi Gaitan   2.  PCP Contacted at Admission =  [  ] Y       [X] N          [  ] N/A  3.  PCP Contacted at Discharge  =  [X] Y       [  ] N          [  ] N/A  4.  Post-Discharge Appointment Date and Location: Not for at least 14 days  5.  Summary of Handoff Given to PCP: Coronavirus positive, will self-quarantine for the next 14 days.

## 2020-03-13 NOTE — DISCHARGE NOTE NURSING/CASE MANAGEMENT/SOCIAL WORK - NSDCPECAREGIVERED_GEN_ALL_CORE
Covid-19 virus Information literature as well as instruction for self-quarantine/isolation at home to prevent transfer of CDC information on instructions to follow at home when sick with COVID-19, masks given to patient

## 2020-03-13 NOTE — DISCHARGE NOTE PROVIDER - HOSPITAL COURSE
29 year old male, with no significant past history, presented to the ED secondary to fever (101F), non-productive cough, generalized fatigue, weakness, and myalgia.  Seen and evaluated at bedside; coughing intermittently, but in NAD.  States fever to 101F max.  Has had non-productive coughing for the past ~ 3 days.  Took prescribed antimicrobials (Augmentin and Tamiflu - per reports), without improvement.  Some mild shortness of breath at rest occasionally - chiefly with deep inspiration.  Indicates no weakness, pain.  Reports of taking ibuprofen and Tylenol PRN, but with only short relief of symptoms.  Does not do vaping.  States no fatigue, weakness or myalgia.  However, noted to have received Ketorolac since admission.  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  States no sick contacts, including co-workers.  No recent travel.  Wife is well and without any similar signs/symptoms.        Patient was febrile in the ED with CT chest showing diffuse b/l GGO. RVP returned negative. Testing for COVID-19 returned positive and patient was continued on airborne/contact isolation. Patient remained afebrile after 48h and abx were d/kylie. His URI symptoms improved and he never required supplemental oxygen.        Case was discussed with hospital administration and patient's PCP, Dr. Michi Gaitan. Due to resolution of fevers and patient's symptoms he will be discharged home on 3/13. Patient reports that his wife will be staying at his brother's family's house for the next 14 days while he self-quarantines. He is aware that he should not leave his house or have any close contact with anyone within the next 14 days. He is able to get food and resources at his home. Patient's PCP Dr. Gaitan is aware of the plan for discharge home. Patient will monitor himself for symptoms at home and was instructed to return to the ER if he develops worsening SOB or cough with fever.

## 2020-03-13 NOTE — PROGRESS NOTE ADULT - PROBLEM SELECTOR PLAN 4
- a few scattered papules on the back with surrounding erythema  - likely associated with current illness  - evaluate for any increased distribution

## 2020-03-13 NOTE — PROGRESS NOTE ADULT - PROBLEM SELECTOR PLAN 1
- non-productive coughing, fever, shortness of breath  - leukopenia resolved  - likely of viral etiology, lower suspicion for bacterial PNA- will d/c abx  - RVP negative  - CT w/ finding of diffuse peripheral ground glass opacities. Findings are nonspecific but can be seen in the setting of a viral pneumonia.  - ketorolac 10 mg Q4H PRN moderate or severe pain, Tylenol PRN fever or mild pain

## 2020-03-13 NOTE — PROGRESS NOTE ADULT - ATTENDING COMMENTS
Case was discussed with hospital administration and patient's PCP, Dr. Michi Gaitan. Due to resolution of fevers and patient's symptoms he will be discharged home today. Patient reports that his wife will be staying at his brother's family's house for the next 14 days while he self-quarantines. He is aware that he should not leave his house or have any close contact with anyone for the next 14 days. He is able to get food and resources at his home. Patient's PCP Dr. Gaitan is aware of the plan for discharge home. Patient will monitor himself for symptoms at home and was instructed to return to the ER if he develops worsening SOB or cough with fever.    45 minute spent on discharge planning

## 2020-03-13 NOTE — PROGRESS NOTE ADULT - SUBJECTIVE AND OBJECTIVE BOX
Jennifer Lamar  Division of Hospital Medicine  Pager #[phone]    Patient is a 29y old  Male who presents with a chief complaint of Pneumonia (12 Mar 2020 16:34)      SUBJECTIVE / OVERNIGHT EVENTS: Patient seen and examined at bedside. Patient with no complaints other than he wants to go home. States that his wife will be staying at his brother's family's house and that he is able to self-quarantine at home. Afebrile for 48h. No cough or SOB.    ADDITIONAL REVIEW OF SYSTEMS:    MEDICATIONS  (STANDING):  cefTRIAXone   IVPB 1000 milliGRAM(s) IV Intermittent every 24 hours  cholecalciferol 1000 Unit(s) Oral daily  influenza   Vaccine 0.5 milliLiter(s) IntraMuscular once  lactated ringers. 1000 milliLiter(s) (100 mL/Hr) IV Continuous <Continuous>    MEDICATIONS  (PRN):  acetaminophen   Tablet .. 650 milliGRAM(s) Oral every 6 hours PRN Temp greater or equal to 38C (100.4F), Mild Pain (1 - 3)  ketorolac 10 milliGRAM(s) Oral every 4 hours PRN Moderate Pain (4 - 6) or Severe Pain (7 - 10)      CAPILLARY BLOOD GLUCOSE        I&O's Summary      PHYSICAL EXAM:    Vital Signs Last 24 Hrs  T(C): 36.8 (13 Mar 2020 05:18), Max: 36.9 (12 Mar 2020 21:33)  T(F): 98.2 (13 Mar 2020 05:18), Max: 98.5 (12 Mar 2020 21:33)  HR: 78 (13 Mar 2020 05:18) (78 - 88)  BP: 129/76 (13 Mar 2020 05:18) (120/70 - 129/76)  BP(mean): --  RR: 18 (13 Mar 2020 05:18) (18 - 18)  SpO2: 99% (13 Mar 2020 05:18) (99% - 100%)    CONSTITUTIONAL: NAD, well-developed, well-groomed  EYES: conjunctiva and sclera clear  ENMT: Moist oral mucosa, no pharyngeal injection or exudates  RESPIRATORY: Normal respiratory effort  CARDIOVASCULAR: Regular rate and rhythm, normal S1 and S2, no murmur/rub/gallop; No lower extremity edema; Peripheral pulses are 2+ bilaterally  ABDOMEN: Nontender to palpation, normoactive bowel sounds, no rebound/guarding  MUSCULOSKELETAL: no clubbing or cyanosis of digits; no joint swelling or tenderness to palpation  PSYCH: A+O to person, place, and time; affect appropriate  NEUROLOGY: CN 2-12 are intact and symmetric; no gross sensory deficits   SKIN: No rashes; no palpable lesions    LABS:                        13.9   4.18  )-----------( 188      ( 13 Mar 2020 05:25 )             41.8     03-13    141  |  102  |  9   ----------------------------<  111<H>  3.4<L>   |  26  |  0.87    Ca    9.2      13 Mar 2020 05:25  Phos  2.7     03-13  Mg     2.4     03-13    TPro  7.8  /  Alb  4.2  /  TBili  0.4  /  DBili  x   /  AST  35  /  ALT  24  /  AlkPhos  86  03-11        Culture - Blood (collected 11 Mar 2020 23:00)  Source: .Blood Blood-Peripheral  Preliminary Report (12 Mar 2020 23:02):    No growth to date.    Culture - Blood (collected 11 Mar 2020 23:00)  Source: .Blood Blood-Venous  Preliminary Report (12 Mar 2020 23:02):    No growth to date.    Culture - Blood (collected 11 Mar 2020 23:00)  Source: .Blood Blood  Preliminary Report (12 Mar 2020 23:02):    No growth to date.    RADIOLOGY & ADDITIONAL TESTS: No new imaging  Results Reviewed: Yes  Imaging Personally Reviewed:  Electrocardiogram Personally Reviewed:    COORDINATION OF CARE:  Care Discussed with Consultants/Other Providers [Y/N]:  Prior or Outpatient Records Reviewed [Y/N]:

## 2020-03-13 NOTE — DISCHARGE NOTE NURSING/CASE MANAGEMENT/SOCIAL WORK - NSDCPNINST_GEN_ALL_CORE
Notify Dr Lamar if you experience any fever >101.00, SOB, chest pain shaking chills.  Drink plenty of fluids.  Continue to follow CDC guidelines for self quarantine until you are cleared by your PCP.

## 2020-03-13 NOTE — DISCHARGE NOTE PROVIDER - PROVIDER TOKENS
FREE:[LAST:[Arnie],FIRST:[Michi],PHONE:[phone]],FAX:[(   [phone]    -],ADDRESS:[[phone] [place] [address]]]

## 2020-03-13 NOTE — DISCHARGE NOTE NURSING/CASE MANAGEMENT/SOCIAL WORK - PATIENT PORTAL LINK FT
You can access the FollowMyHealth Patient Portal offered by [facility] by registering at the following website: [email or web address]. By joining [employer]’s FollowMyHealth portal, you will also be able to view your health information using other applications (apps) compatible with our system.

## 2020-03-16 LAB
CULTURE RESULTS: SIGNIFICANT CHANGE UP
SPECIMEN SOURCE: SIGNIFICANT CHANGE UP

## 2020-06-01 NOTE — PATIENT PROFILE ADULT - BRADEN MOBILITY
EPWORTH SLEEPINESS SCALE - PATIENT RESPONSES    Situation             Response  Sitting and reading 3   Watching TV 3   Sitting inactive in a public place (e.g. A theatre or a meeting) 2   As a passenger in a car for an hour without a break 3   Lying down to rest in the afternoon when circumstances permit 3   Sitting and talking to someone 1   Sitting quietly after lunch without alcohol 3   In a car while stopped for a few minutes in traffic 1   Total score 19        (4) no limitation

## 2021-08-26 NOTE — DISCHARGE NOTE PROVIDER - NSDCADMDATE_GEN_ALL_CORE_FT
72yo  Faroese speaking male w/ PMHx of HTN, DM2, BPH, asthma presented on 8/12/21 from home with worsening shortness of breath x 2 weeks in setting of COVID-19 PNA now, with progressive worsening hypoxic respiratory failure , failed NIPPV support , s/p intubation and s/p cardiac arrest afterwards; with ROSC after 6 min, VT x 2 s/p electrical shock x 2 and 2 epi's. and transfer to [facility] ICU for monitoring and management. Patient remains intubated for respiratory failure, currently on mechanical ventilator, sedated and paralyzed.     Assessment and Plans:    #Neuro:   -sedated with Ketamine, Fentanyl, and paralyzed with Nimbex- baseline mental status A&O x 3  -remains off of propofol- triglycerides trending down  -c/w ketamine  -c/w fentanyl  -neuro checks per ICU    #Pulm: Hypoxic Respiratory Failure 2/2 COVID PNA c/b ARDS  ARDS protocol ; permissive hypercarbia  -pt prone (#1) 8/21 @ 10 am - ABG improved , (#2) 8/22 @ 2:30pm   -pt proned overnight to be re supined this AM 8/23 at 8:30 AM, remains in the supine position since- will  prone if hypoxia worsens- however stable at this time  - AC 28/400/50/10- pPlat 28 / PP 31- will f/u to assess possibility of weaning down on the vent at all  -"8/21 CXR to eval extension of SQ air ; preliminary results with R >L neck SQ air , pneumomediastinum ;  repeat CXR 8/22 with Diffuse opacification of the lungs, mild-moderate. Pneumomediastinum resolved,"  -wean vent settings as tolerated   -completed remdesevir 8/13-8/17  -completed dexamethasone 8/13-8/23  -c/w neb tx   -chest PT, oral care, pulmonary toileting     r/o PE  Pt with + DVT on repeated duplex 9/23, high r/f PE in setting of hypercoagulable state with COVID -19 infection   -Pt not hemodynamically stable for CTA chest.   -it would not  as pt is already on full AC w/ Lovenox 80 BID for + DVT, Anti 10a factor level 1.7  -bleeding precaution   -trend DD,   -TTE 8/20;  there are no interpretable images 2/2 SQ air,     HX ASTHMA   -c/w home Symbicort  -c/w home montelukast  -c/w duoneb tx    #CV:  Cardiogenic shock s/p PEA followed by VT x 2 s/p ROSC,  Septic shock / hypovolemic shock/ distributive shock , + DVT , r/o PE, new onset A fib   -s/p  cardiac arrest post intubation with 6 minutes achieved ROSC; c/b VT x 2 s/p electrical shock x2   -new onset of A fib RVR w/ hemodynamic instability s/p amio 300mg x1 ; converted to NSR   -on arrival to ICU s/p chemically resuscitated with no loss of pulse (8/19)   -remains in Shock with pressors support ; Vaso 0.04, add Norepinephrine back if needed , titrate  to maintain a MAP >65   -TTE; limited to due SQ air   -c/w full AC on Lovenox sq 80 BID for + DVT and presumed PE   - repeated duplex on 8/23 with persisting lower extremity DVT b/l  -c/w Vasopressin, add Norepinephrine if pt becomes hypotensive    #/Renal:   -TAYLOR resolved  -c/w trending renal function and electrolytes/   optimize electrolyte balance   -daily weight   BPH (benign prostatic hyperplasia).   -c/w holding all meds due to hypotension.  -c/w mace - strict  i&o's     #GI: Oropharyngeal dysphagia   -OGT for TF- tolerating well- increase goal ot 60cc/hr-  -c/w bowel regimen   -PPI qd   -aspiration precaution   -oral care      Hypertriglyceridemia   -likely in setting of propofol use   -titrated off propofol  -repeat triglycerides daily      #ENDO:  A1c 7.8  TF w/ Glucerna   -FS q 6 hrs   -ISS & NPH q 6 hrs   -endocrine consult appreciated      #Heme:  + DVT , presumed  PE- in setting of CVOID-19 illness/ hypercoagulable state   -c/w FULL AC 80 BID Lovenox, anti 10A factor 1.7 on 8/23  -c/w trending Ddimer   -bleeding precaution     #ID:   Septic shock in setting of COVID-19 PNA   -IV pressors; levo, Vaso support to maintain MAP>65  -UA & Ucx negative 8/20, repeat today 8/25 if UA + change mace and draw urine culture  -MRSA negative 8/21   -BC negative x 2 8/20, repeat B.C. x 2 today as pt febrile over night and WBC uptrended  to 24.79 from 16.49  -sputum copious amount- send sputum cx  - fungitel pending results   -empiric abx ; meropenem 1g TID (8/20-8/23) , Vanco 1 g x 1 (8/21), restart ABX coverage with Zosyn d/c today   -8/24 BC x 2 - +MRSA- d/c zosyn / start Vanco    COVID-19 PNA   - s/p Tocilizumab on 8/15  -s/p remdesevir 8/13-8/17  -c/w dexamethasone 8/13-8/23   -trend inflammatory markers   -ID consult on board     #PPX:  DVT ppx ; on FULL AC with lovenox SQ  Gi ppx; protonix IVP q d     #GOC  FULL code at this time.   Goals of care d/w son and 2 daughters    11-Mar-2020 19:41 70yo  Nepali speaking male w/ PMHx of HTN, DM2, BPH, asthma presented on 8/12/21 from home with worsening shortness of breath x 2 weeks in setting of COVID-19 PNA now, with progressive worsening hypoxic respiratory failure , failed NIPPV support , s/p intubation and s/p cardiac arrest afterwards; with ROSC after 6 min, VT x 2 s/p electrical shock x 2 and 2 epi's. and transfer to [facility] ICU for monitoring and management. Patient remains intubated for respiratory failure, currently on mechanical ventilator, sedated and paralyzed.     Assessment and Plans:    #Neuro:   -sedated with Ketamine, Fentanyl, and paralyzed with Nimbex- baseline mental status A&O x 3  -remains off of propofol- triglycerides trending down  -c/w ketamine  -c/w fentanyl  -neuro checks per ICU    #Pulm: Hypoxic Respiratory Failure 2/2 COVID PNA c/b ARDS  ARDS protocol ; permissive hypercarbia  -pt prone (#1) 8/21 @ 10 am - ABG improved , (#2) 8/22 @ 2:30pm   -pt proned overnight to be re supined this AM 8/23 at 8:30 AM, remains in the supine position since- will  prone if hypoxia worsens- however stable at this time  - Pt currently on PC rate 30, Ins. Pressure 17, peeep 5 and fio2 60%- pPlat 24 / PP 26- will weaning down as tolerates  -8/21 CXR to eval extension of SQ air ; preliminary results with R >L neck SQ air , pneumomediastinum ;  repeat CXR 8/22 with Diffuse opacification of the lungs, mild-moderate. Pneumomediastinum resolved,  -completed remdesevir 8/13-8/17  -completed dexamethasone 8/13-8/23  -c/w neb tx   -chest PT, oral care, pulmonary toileting     r/o PE  Pt with + DVT on repeated duplex 9/23, high r/f PE in setting of hypercoagulable state with COVID -19 infection   -Pt not hemodynamically stable for CTA chest.   -it would not  as pt is already on full AC w/ Lovenox 80 BID for + DVT, Anti 10a factor level 1.7  -bleeding precaution   -trend DD,   -TTE 8/20;  there are no interpretable images 2/2 SQ air,     HX ASTHMA   -c/w home Symbicort  -c/w home montelukast  -c/w duoneb tx    #CV:  Cardiogenic shock s/p PEA followed by VT x 2 s/p ROSC,  Septic shock / hypovolemic shock/ distributive shock , + DVT , r/o PE, new onset A fib   -s/p  cardiac arrest post intubation with 6 minutes achieved ROSC; c/b VT x 2 s/p electrical shock x2   -new onset of A fib RVR w/ hemodynamic instability s/p amio 300mg x1 ; converted to NSR   -on arrival to ICU s/p chemically resuscitated with no loss of pulse (8/19)   -remains in Shock with pressors support ; Vaso 0.04, add Norepinephrine back if needed , titrate  to maintain a MAP >65   -TTE; limited to due SQ air   -c/w full AC on Lovenox sq 80 BID for + DVT and presumed PE   - repeated duplex on 8/23 with persisting lower extremity DVT b/l  -Norepinephrine if pt becomes hypotensive    #/Renal:   -TAYLOR resolved  -c/w trending renal function and electrolytes/   optimize electrolyte balance   -daily weight   BPH (benign prostatic hyperplasia).   -c/w holding all meds due to hypotension.  -c/w mace - strict  i&o's     #GI: Oropharyngeal dysphagia   -OGT for TF- tolerating well- increase goal ot 60cc/hr-  -c/w bowel regimen, last BM 8/26  -PPI qd   -aspiration precaution   -oral care      Hypertriglyceridemia   -likely in setting of propofol use   -titrated off propofol  -repeat triglycerides daily      #ENDO:  A1c 7.8  TF w/ Glucerna   -FS q 6 hrs   -ISS & NPH q 6 hrs   -endocrine consult appreciated      #Heme:  + DVT , presumed  PE- in setting of CVOID-19 illness/ hypercoagulable state   -c/w FULL AC 80 BID Lovenox, anti 10A factor 1.7 on 8/23  -c/w trending Ddimer   -bleeding precaution     #ID:   Septic shock in setting of COVID-19 PNA   -IV pressors; levo, Vaso support to maintain MAP>65  -UA & Ucx negative 8/20, repeat today 8/25 if UA + change mace and draw urine culture  -MRSA negative 8/21   -BC negative x 2 8/20, repeat B.C. x 2 today as pt febrile over night and WBC uptrended  to 24.79 from 16.49  -sputum copious amount- send sputum cx  - fungitel pending results   -empiric abx ; meropenem 1g TID (8/20-8/23) , Vanco 1 g x 1 (8/21), restart ABX coverage with Zosyn d/c today   -8/24 BC x 2 - +MRSA- d/c zosyn / start Vanco    COVID-19 PNA   - s/p Tocilizumab on 8/15  -s/p remdesevir 8/13-8/17  -c/w dexamethasone 8/13-8/23   -trend inflammatory markers   -ID consult on board     #PPX:  DVT ppx ; on FULL AC with lovenox SQ  Gi ppx; protonix IVP q d     #GOC  FULL code at this time.   Goals of care d/w son and 2 daughters    72yo  Greek speaking male w/ PMHx of HTN, DM2, BPH, asthma presented on 8/12/21 from home with worsening shortness of breath x 2 weeks in setting of COVID-19 PNA now, with progressive worsening hypoxic respiratory failure , failed NIPPV support , s/p intubation and s/p cardiac arrest afterwards; with ROSC after 6 min, VT x 2 s/p electrical shock x 2 and 2 epi's. and transfer to [facility] ICU for monitoring and management. Patient remains intubated for respiratory failure, currently on mechanical ventilator, sedated and paralyzed.     Assessment and Plans:    #Neuro:   -sedated with Ketamine, Fentanyl, and paralyzed with Nimbex- baseline mental status A&O x 3  -remains off of propofol- triglycerides trending down  -c/w ketamine  -c/w fentanyl  -neuro checks per ICU    #Pulm: Hypoxic Respiratory Failure 2/2 COVID PNA c/b ARDS  ARDS protocol ; permissive hypercarbia  -pt prone (#1) 8/21 @ 10 am - ABG improved , (#2) 8/22 @ 2:30pm   -pt proned overnight to be re supined this AM 8/23 at 8:30 AM, remains in the supine position since- will  prone if hypoxia worsens- however stable at this time  - Pt currently on PC rate 30, Ins. Pressure 17, peeep 5 and fio2 60%- pPlat 24 / PP 26- will weaning down as tolerates  -8/21 CXR to eval extension of SQ air ; preliminary results with R >L neck SQ air , pneumomediastinum ;  repeat CXR 8/22 with Diffuse opacification of the lungs, mild-moderate. Pneumomediastinum resolved,  -completed remdesevir 8/13-8/17  -completed dexamethasone 8/13-8/23  -c/w neb tx   -chest PT, oral care, pulmonary toileting     r/o PE  Pt with + DVT on repeated duplex 9/23, high r/f PE in setting of hypercoagulable state with COVID -19 infection   -Pt not hemodynamically stable for CTA chest.   -it would not  as pt is already on full AC w/ Lovenox 80 BID for + DVT, Anti 10a factor level 1.7  -bleeding precaution   -trend DD,   -TTE 8/20;  there are no interpretable images 2/2 SQ air,     HX ASTHMA   -c/w home Symbicort  -c/w home montelukast  -c/w duoneb tx    #CV:  Cardiogenic shock s/p PEA followed by VT x 2 s/p ROSC,  Septic shock / hypovolemic shock/ distributive shock , + DVT , r/o PE, new onset A fib   -s/p  cardiac arrest post intubation with 6 minutes achieved ROSC; c/b VT x 2 s/p electrical shock x2   -new onset of A fib RVR w/ hemodynamic instability s/p amio 300mg x1 ; converted to NSR   -on arrival to ICU s/p chemically resuscitated with no loss of pulse (8/19)   -remains in Shock with pressors support ; Vaso 0.04, add Norepinephrine back if needed , titrate  to maintain a MAP >65   -TTE; limited to due SQ air   -c/w full AC on Lovenox sq 80 BID for + DVT and presumed PE   - repeated duplex on 8/23 with persisting lower extremity DVT b/l  -Norepinephrine if pt becomes hypotensive    #/Renal:   -TAYLOR resolved  -c/w trending renal function and electrolytes/   optimize electrolyte balance   -daily weight   BPH (benign prostatic hyperplasia).   -c/w holding all meds due to hypotension.  -c/w mace - strict  i&o's     #GI: Oropharyngeal dysphagia   -OGT for TF- tolerating well- increase goal ot 60cc/hr-  -c/w bowel regimen, last BM 8/26  -PPI qd   -aspiration precaution   -oral care      Hypertriglyceridemia   -likely in setting of propofol use   -titrated off propofol  -repeat triglycerides daily      #ENDO:  A1c 7.8  TF w/ Glucerna   -FS q 6 hrs   -ISS & NPH q 6 hrs   -endocrine consult appreciated      #Heme:  + DVT , presumed  PE- in setting of CVOID-19 illness/ hypercoagulable state   -c/w FULL AC 80 BID Lovenox, anti 10A factor 1.7 on 8/23  -c/w trending Ddimer   -bleeding precaution     #ID:   Septic shock in setting of COVID-19 PNA   -IV pressors; levo, Vaso support to maintain MAP>65  -UA & Ucx negative 8/20, repeat today 8/25 if UA + change mace and draw urine culture  -MRSA negative 8/21   -BC negative x 2 8/20, repeat B.C. x 2 today as pt febrile over night and WBC uptrended  to 24.79 from 16.49  -sputum copious amount- send sputum cx  - fungitel pending results   -empiric abx ; meropenem 1g TID (8/20-8/23) , Vanco 1 g x 1 (8/21), restart ABX coverage with Zosyn d/c today   -8/24 BC x 2 - +MRSE- d/c zosyn / on Vanco, sputum cx with rare yeast like cells    COVID-19 PNA   - s/p Tocilizumab on 8/15  -s/p remdesevir 8/13-8/17  -c/w dexamethasone 8/13-8/23   -trend inflammatory markers   -ID consult on board     #PPX:  DVT ppx ; on FULL AC with lovenox SQ  Gi ppx; protonix IVP q d     #GOC  FULL code at this time.   Goals of care d/w son and 2 daughters    70yo  Turkmen speaking male w/ PMHx of HTN, DM2, BPH, asthma presented on 8/12/21 from home with worsening shortness of breath x 2 weeks in setting of COVID-19 PNA now, with progressive worsening hypoxic respiratory failure , failed NIPPV support , s/p intubation and s/p cardiac arrest afterwards; with ROSC after 6 min, VT x 2 s/p electrical shock x 2 and 2 epi's. and transfer to [facility] ICU for monitoring and management. Patient remains intubated for respiratory failure, currently on mechanical ventilator, sedated and paralyzed.     Assessment and Plans:    #Neuro:   -sedated with Ketamine, Fentanyl, and paralyzed with Nimbex- baseline mental status A&O x 3  -remains off of propofol- triglycerides trending down  -c/w ketamine  -c/w fentanyl  -neuro checks per ICU    #Pulm: Hypoxic Respiratory Failure 2/2 COVID PNA c/b ARDS  ARDS protocol ; permissive hypercarbia  -pt prone (#1) 8/21 @ 10 am - ABG improved , (#2) 8/22 @ 2:30pm   -pt proned overnight to be re supined this AM 8/23 at 8:30 AM, remains in the supine position since- will  prone if hypoxia worsens- however stable at this time  - Pt currently on PC rate 30, Ins. Pressure 17, peeep 5 and fio2 60%- pPlat 24 / PP 26- will weaning down as tolerates  -8/21 CXR to eval extension of SQ air ; preliminary results with R >L neck SQ air , pneumomediastinum ;  repeat CXR 8/22 with Diffuse opacification of the lungs, mild-moderate. Pneumomediastinum resolved,  -completed remdesevir 8/13-8/17  -completed dexamethasone 8/13-8/23  -c/w neb tx   -chest PT, oral care, pulmonary toileting     r/o PE  Pt with + DVT on repeated duplex 9/23, high r/f PE in setting of hypercoagulable state with COVID -19 infection   -Pt not hemodynamically stable for CTA chest.   -it would not  as pt is already on full AC w/ Lovenox 80 BID for + DVT, Anti 10a factor level 1.7  -bleeding precaution   -trend DD,   -TTE 8/20;  there are no interpretable images 2/2 SQ air,     HX ASTHMA   -c/w home Symbicort  -c/w home montelukast  -c/w duoneb tx    #CV:  Cardiogenic shock s/p PEA followed by VT x 2 s/p ROSC,  Septic shock / hypovolemic shock/ distributive shock , + DVT , r/o PE, new onset A fib   -s/p  cardiac arrest post intubation with 6 minutes achieved ROSC; c/b VT x 2 s/p electrical shock x2   -new onset of A fib RVR w/ hemodynamic instability s/p amio 300mg x1 ; converted to NSR   -on arrival to ICU s/p chemically resuscitated with no loss of pulse (8/19)   -remains in Shock with pressors support ; Vaso 0.04, add Norepinephrine back if needed , titrate  to maintain a MAP >65   -TTE; limited to due SQ air   -c/w full AC on Lovenox sq 80 BID for + DVT and presumed PE   - repeated duplex on 8/23 with persisting lower extremity DVT b/l  -Norepinephrine if pt becomes hypotensive    #/Renal:   -TAYLOR resolved  -c/w trending renal function and electrolytes/   -optimize electrolyte balance   -Started on Zaroxolyn with Lasix   -daily weight   BPH (benign prostatic hyperplasia).   -c/w holding all meds due to hypotension.  -c/w mace - strict  i&o's     #GI: Oropharyngeal dysphagia   -OGT for TF- tolerating well- increase goal ot 60cc/hr-  -c/w bowel regimen, last BM 8/26  -PPI qd   -aspiration precaution   -oral care      Hypertriglyceridemia   -likely in setting of propofol use   -titrated off propofol  -repeat triglycerides daily      #ENDO:  A1c 7.8  TF w/ Glucerna   -FS q 6 hrs   -ISS & NPH q 6 hrs   -endocrine consult appreciated      #Heme:  + DVT , presumed  PE- in setting of CVOID-19 illness/ hypercoagulable state   -c/w FULL AC 80 BID Lovenox, anti 10A factor 1.7 on 8/23  -c/w trending Ddimer   -bleeding precaution     #ID:   Septic shock in setting of COVID-19 PNA   -IV pressors; levo, Vaso support to maintain MAP>65  -UA & Ucx negative 8/20, repeat today 8/25 if UA + change mace and draw urine culture  -MRSA negative 8/21   -BC negative x 2 8/20, repeat B.C. x 2 today as pt febrile over night and WBC uptrended  to 24.79 from 16.49  -sputum copious amount- send sputum cx  - fungitel pending results   -empiric abx ; meropenem 1g TID (8/20-8/23) , Vanco 1 g x 1 (8/21), restart ABX coverage with Zosyn d/c today   -8/24 BC x 2 - +MRSE- d/c zosyn / currently on Vancomycin   -sputum cx with rare yeast like cells    COVID-19 PNA   - s/p Tocilizumab on 8/15  -s/p remdesevir 8/13-8/17  -c/w dexamethasone 8/13-8/23   -trend inflammatory markers   -ID consult on board     #PPX:  DVT ppx ; on FULL AC with lovenox SQ  Gi ppx; protonix IVP q d     #GOC  FULL code at this time.   Goals of care d/w son and 2 daughters

## 2024-01-31 NOTE — PROGRESS NOTE ADULT - PROBLEM SELECTOR PLAN 1
- non-productive coughing, fever, shortness of breath  - now with leukopenia  - likely of viral etiology, but could have superimposed bacterial pneumonia  - RVP negative  - CT w/ finding of Diffuse peripheral ground glass opacities. Findings are nonspecific but can be seen in the setting of a viral pneumonia.  - failed outpatient Augmentin and Tamiflu  - C/w ceftriaxone and azithromycin while pending COVID-19 PCR  - ketorolac 10 mg Q4H PRN moderate or severe pain, Tylenol PRN fever or mild pain  - f/u cultures, including COVID-19- would recommending re-swabbing if negative given high suspicion with symptoms and CT chest findings (1) Other Medications/None

## 2024-04-29 NOTE — ED PROVIDER NOTE - CPE EDP CARDIAC NORM
Received request via: Patient    Was the patient seen in the last year in this department? Yes    Does the patient have an active prescription (recently filled or refills available) for medication(s) requested? No    Ph[facility] Name[facility]     Does the patient have [facility] and need 100 day supply (blood pressure, diabetes and cholesterol meds only)? Patient does not have SCP   normal...
